# Patient Record
Sex: FEMALE | Race: WHITE | Employment: UNEMPLOYED | URBAN - NONMETROPOLITAN AREA
[De-identification: names, ages, dates, MRNs, and addresses within clinical notes are randomized per-mention and may not be internally consistent; named-entity substitution may affect disease eponyms.]

---
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
## 2020-02-02 ENCOUNTER — HOSPITAL ENCOUNTER (EMERGENCY)
Age: 20
Discharge: HOME | End: 2020-02-02
Payer: COMMERCIAL

## 2020-02-02 VITALS
OXYGEN SATURATION: 98 % | SYSTOLIC BLOOD PRESSURE: 134 MMHG | DIASTOLIC BLOOD PRESSURE: 76 MMHG | WEIGHT: 293 LBS | HEIGHT: 66 IN | RESPIRATION RATE: 16 BRPM | HEART RATE: 89 BPM | TEMPERATURE: 98.4 F | BODY MASS INDEX: 47.09 KG/M2

## 2020-02-02 PROCEDURE — 99212 OFFICE O/P EST SF 10 MIN: CPT

## 2020-02-02 PROCEDURE — 99213 OFFICE O/P EST LOW 20 MIN: CPT | Performed by: NURSE PRACTITIONER

## 2020-02-02 RX ORDER — CEFDINIR 300 MG/1
300 CAPSULE ORAL 2 TIMES DAILY
Qty: 14 CAPSULE | Refills: 0 | Status: SHIPPED | OUTPATIENT
Start: 2020-02-02 | End: 2020-02-09

## 2020-02-02 RX ORDER — DEXTROMETHORPHAN HYDROBROMIDE AND PROMETHAZINE HYDROCHLORIDE 15; 6.25 MG/5ML; MG/5ML
SYRUP ORAL
Status: ON HOLD | COMMUNITY
Start: 2020-01-31 | End: 2022-09-21

## 2020-02-02 RX ORDER — VITAMIN E (DL,TOCOPHERYL ACET) 180 MG
CAPSULE ORAL
Status: ON HOLD | COMMUNITY
Start: 2020-01-31 | End: 2022-09-21

## 2020-02-02 RX ORDER — OSELTAMIVIR PHOSPHATE 75 MG/1
CAPSULE ORAL
Status: ON HOLD | COMMUNITY
Start: 2020-01-31 | End: 2022-09-21

## 2020-02-02 RX ORDER — VITAMIN B COMPLEX
CAPSULE ORAL
Status: ON HOLD | COMMUNITY
Start: 2020-01-31 | End: 2022-09-21

## 2020-02-02 RX ORDER — ESCITALOPRAM OXALATE 20 MG/1
TABLET ORAL
Status: ON HOLD | COMMUNITY
Start: 2020-01-31 | End: 2022-10-03 | Stop reason: ALTCHOICE

## 2020-02-02 ASSESSMENT — ENCOUNTER SYMPTOMS
SORE THROAT: 1
VOMITING: 0
NAUSEA: 0
SINUS PRESSURE: 1
COUGH: 1
SHORTNESS OF BREATH: 0

## 2020-02-02 NOTE — ED PROVIDER NOTES
[facility]  Urgent Care Encounter       CHIEF COMPLAINT       Chief Complaint   Patient presents with    Cough    Congestion    Pharyngitis       Nurses Notes reviewed and I agree except as noted in the HPI. HISTORY OF PRESENT ILLNESS   Wale Lawrence is a 23 y.o. female who presents for evaluation of sore throat, headache, cough, congestion and postnasal drainage that is been ongoing for the past 6 to 7 days. Patient states that she was seen and evaluated at [place] [address] walk-in care 3 days ago and was told that she had an influenza-like illness. States that she was placed on Tamiflu and has been taking over-the-counter cough medications as well as Tylenol Cold and flu at home which do provide relief for a short amount of time, however the symptoms quickly returned. She denies any recorded fevers or chills. The history is provided by the patient. REVIEW OF SYSTEMS     Review of Systems   Constitutional: Negative for chills and fever. HENT: Positive for congestion, postnasal drip, sinus pressure and sore throat. Respiratory: Positive for cough. Negative for shortness of breath. Cardiovascular: Negative for chest pain. Gastrointestinal: Negative for nausea and vomiting. Musculoskeletal: Negative for arthralgias and myalgias. Skin: Negative for rash. Allergic/Immunologic: Negative for environmental allergies. Neurological: Positive for headaches. PAST MEDICAL HISTORY         Diagnosis Date    Depression        SURGICALHISTORY     Patient  has a past surgical history that includes Tonsillectomy.     CURRENT MEDICATIONS       Previous Medications    B COMPLEX VITAMINS CAPSULE    Take by mouth    ESCITALOPRAM (LEXAPRO) 20 MG TABLET    Take by mouth    MULTIPLE VITAMINS-MINERALS (HAIR SKIN NAILS) CAPS    Take by mouth    OSELTAMIVIR (TAMIFLU) 75 MG CAPSULE    take 1 capsule by mouth twice a day    PROMETHAZINE-DEXTROMETHORPHAN She exhibits normal range of motion. Left knee: She exhibits normal range of motion. Skin:     General: Skin is warm. Findings: No rash. Neurological:      Mental Status: She is alert and oriented to person, place, and time. Psychiatric:         Behavior: Behavior normal.         DIAGNOSTIC RESULTS     Labs:No results found for this visit on 02/02/20. IMAGING:    No orders to display         EKG:  none    URGENT CARE COURSE:     Vitals:    02/02/20 1244   BP: 134/76   Pulse: 89   Resp: 16   Temp: 98.4 °F (36.9 °C)   TempSrc: Temporal   SpO2: 98%   Weight: (!) 314 lb (142.4 kg)   Height: 5' 6\" (1.676 m)       Medications - No data to display         PROCEDURES:  None    FINAL IMPRESSION      1. Acute maxillary sinusitis, recurrence not specified          DISPOSITION/ PLAN     Discussed with the patient that exam is consistent with bacterial sinusitis at this time. I discussed the patient that this likely began as a viral upper respiratory infection that has progressed into sinusitis. Discussed the plan to treat with oral antibiotics and she is advised to continue over-the-counter cough and cold medications. She is agreeable to plan as discussed.       PATIENT REFERRED TO:  MARIA LUZ Marvin CNP  [address] / [facility] [address]      DISCHARGE MEDICATIONS:  New Prescriptions    CEFDINIR (OMNICEF) 300 MG CAPSULE    Take 1 capsule by mouth 2 times daily for 7 days       Discontinued Medications    No medications on file       Current Discharge Medication List          MARIA LUZ Loza CNP    (Please note that portions of this note were completed with a voice recognition program. Efforts were made to edit the dictations but occasionally words are mis-transcribed.)          MARIA LUZ Loza CNP  02/02/20 8934

## 2020-02-02 NOTE — ED TRIAGE NOTES
Aleena Beltran arrives by self  to room with complaint of cough congestion sore throat symptoms started 6 days ago. Casandra at [address] [place] [address] clinic Friday tx with tamiflu and cough syrup for flu like symptoms concerned at she is not getting better.

## 2022-02-08 ENCOUNTER — HOSPITAL ENCOUNTER (OUTPATIENT)
Age: 22
Setting detail: SPECIMEN
Discharge: HOME | End: 2022-02-08

## 2022-02-10 LAB
CULTURE: ABNORMAL
Lab: ABNORMAL
SPECIMEN DESCRIPTION: ABNORMAL

## 2022-09-21 ENCOUNTER — HOSPITAL ENCOUNTER (OUTPATIENT)
Age: 22
Discharge: HOME | End: 2022-09-21
Attending: STUDENT IN AN ORGANIZED HEALTH CARE EDUCATION/TRAINING PROGRAM | Admitting: OBSTETRICS & GYNECOLOGY
Payer: COMMERCIAL

## 2022-09-21 VITALS
SYSTOLIC BLOOD PRESSURE: 132 MMHG | DIASTOLIC BLOOD PRESSURE: 68 MMHG | WEIGHT: 293 LBS | RESPIRATION RATE: 16 BRPM | BODY MASS INDEX: 43.4 KG/M2 | HEIGHT: 69 IN | TEMPERATURE: 97.2 F | HEART RATE: 89 BPM | OXYGEN SATURATION: 97 %

## 2022-09-21 PROBLEM — O26.90 PREGNANCY COMPLICATION, ANTEPARTUM: Status: ACTIVE | Noted: 2022-09-21

## 2022-09-21 LAB
ALBUMIN SERPL-MCNC: 3.6 G/DL (ref 3.5–5.1)
ALP BLD-CCNC: 108 U/L (ref 38–126)
ALT SERPL-CCNC: 15 U/L (ref 11–66)
ANION GAP SERPL CALCULATED.3IONS-SCNC: 13 MEQ/L (ref 8–16)
AST SERPL-CCNC: 15 U/L (ref 5–40)
BILIRUB SERPL-MCNC: 0.2 MG/DL (ref 0.3–1.2)
BUN BLDV-MCNC: 9 MG/DL (ref 7–22)
CALCIUM SERPL-MCNC: 9.3 MG/DL (ref 8.5–10.5)
CHLORIDE BLD-SCNC: 105 MEQ/L (ref 98–111)
CO2: 19 MEQ/L (ref 23–33)
CREAT SERPL-MCNC: 0.6 MG/DL (ref 0.4–1.2)
CREATININE URINE: 128.8 MG/DL
ERYTHROCYTE [DISTWIDTH] IN BLOOD BY AUTOMATED COUNT: 14.5 % (ref 11.5–14.5)
ERYTHROCYTE [DISTWIDTH] IN BLOOD BY AUTOMATED COUNT: 43.2 FL (ref 35–45)
GFR SERPL CREATININE-BSD FRML MDRD: > 90 ML/MIN/1.73M2
GLUCOSE BLD-MCNC: 89 MG/DL (ref 70–108)
HCT VFR BLD CALC: 37.5 % (ref 37–47)
HEMOGLOBIN: 12.4 GM/DL (ref 12–16)
MCH RBC QN AUTO: 27.3 PG (ref 26–33)
MCHC RBC AUTO-ENTMCNC: 33.1 GM/DL (ref 32.2–35.5)
MCV RBC AUTO: 82.6 FL (ref 81–99)
PLATELET # BLD: 217 THOU/MM3 (ref 130–400)
PMV BLD AUTO: 14.1 FL (ref 9.4–12.4)
POTASSIUM SERPL-SCNC: 3.7 MEQ/L (ref 3.5–5.2)
PROT/CREAT RATIO, UR: 0.44
PROTEIN, URINE: 56.2 MG/DL
RBC # BLD: 4.54 MILL/MM3 (ref 4.2–5.4)
SODIUM BLD-SCNC: 137 MEQ/L (ref 135–145)
TOTAL PROTEIN: 6.7 G/DL (ref 6.1–8)
WBC # BLD: 8.5 THOU/MM3 (ref 4.8–10.8)

## 2022-09-21 PROCEDURE — 85027 COMPLETE CBC AUTOMATED: CPT

## 2022-09-21 PROCEDURE — 6370000000 HC RX 637 (ALT 250 FOR IP): Performed by: OBSTETRICS & GYNECOLOGY

## 2022-09-21 PROCEDURE — 82570 ASSAY OF URINE CREATININE: CPT

## 2022-09-21 PROCEDURE — 84156 ASSAY OF PROTEIN URINE: CPT

## 2022-09-21 PROCEDURE — 36415 COLL VENOUS BLD VENIPUNCTURE: CPT

## 2022-09-21 PROCEDURE — 80053 COMPREHEN METABOLIC PANEL: CPT

## 2022-09-21 RX ORDER — INSULIN LISPRO 100 [IU]/ML
40 INJECTION, SOLUTION INTRAVENOUS; SUBCUTANEOUS NIGHTLY
Status: ON HOLD | COMMUNITY
End: 2022-10-19 | Stop reason: HOSPADM

## 2022-09-21 RX ORDER — NIFEDIPINE 30 MG/1
60 TABLET, EXTENDED RELEASE ORAL DAILY
Status: ON HOLD | COMMUNITY
End: 2022-10-19 | Stop reason: HOSPADM

## 2022-09-21 RX ORDER — BUTALBITAL, ACETAMINOPHEN AND CAFFEINE 50; 325; 40 MG/1; MG/1; MG/1
1 TABLET ORAL ONCE
Status: COMPLETED | OUTPATIENT
Start: 2022-09-21 | End: 2022-09-21

## 2022-09-21 RX ADMIN — BUTALBITAL, ACETAMINOPHEN, AND CAFFEINE 1 TABLET: 50; 325; 40 TABLET ORAL at 19:31

## 2022-09-21 ASSESSMENT — PAIN SCALES - GENERAL: PAINLEVEL_OUTOF10: 4

## 2022-09-21 ASSESSMENT — PAIN - FUNCTIONAL ASSESSMENT: PAIN_FUNCTIONAL_ASSESSMENT: ACTIVITIES ARE NOT PREVENTED

## 2022-09-21 ASSESSMENT — PAIN DESCRIPTION - LOCATION: LOCATION: HEAD

## 2022-09-21 ASSESSMENT — PAIN DESCRIPTION - DESCRIPTORS
DESCRIPTORS: ACHING
DESCRIPTORS: DISCOMFORT;NAGGING

## 2022-09-21 NOTE — FLOWSHEET NOTE
Dr. Gonzalo Medrano in department. Reviewed Protein creatinin ratio, CBC results, vitals. Fioricet given and will recheck if resolved. Pt wanting to eat, pt ok to eat.

## 2022-09-21 NOTE — PLAN OF CARE
Problem: Pain  Goal: Verbalizes/displays adequate comfort level or baseline comfort level  Outcome: Progressing  Flowsheets (Taken 9/21/2022 1842)  Verbalizes/displays adequate comfort level or baseline comfort level:   Assess pain using appropriate pain scale   Notify Licensed Independent Practitioner if interventions unsuccessful or patient reports new pain   Consider cultural and social influences on pain and pain management     Problem: Discharge Planning  Goal: Discharge to home or other facility with appropriate resources  Outcome: Progressing  Flowsheets (Taken 9/21/2022 1842)  Discharge to home or other facility with appropriate resources:   Refer to discharge planning if patient needs post-hospital services based on physician order or complex needs related to functional status, cognitive ability or social support system   Arrange for needed discharge resources and transportation as appropriate     Care plan reviewed with patient. Patient verbalize understanding of the plan of care and contribute to goal setting.

## 2022-09-21 NOTE — FLOWSHEET NOTE
Dr Heriberto Worthington returned page, informed of  at 33+4 weeks. Pt is a gestational diabetic on insulin and has been diagnosed with pre-eclampsia on procardia xl. Pt presents with a c/o a headache and increased BP's. Reactive fetal tracing obtained no contractions noted. 2+ pitting edema noted in lower extremities.  Orders received

## 2022-09-21 NOTE — FLOWSHEET NOTE
Pt presents to 5c05 with c/o increased BP's, and a headache. Pt denies any vaginal bleeding or leaking of fluids, fetal movement noted per pt. Pt oriented to room and call system. Patient to bathroom to void, informed of maternal drug testing policy in place on all laboring patients. Verbal consent received, paper consent to be signed and urine to be sent when pt delivers.

## 2022-09-22 NOTE — PROCEDURES
Department of Obstetrics and Gynecology  Labor and Delivery  NST Triage Note      Pt Name: Carole Gann  MRN: [id number] Abdiaziz #: [de-identified]  YOB: 2000  Procedure Performed By: Emily Schroeder MD, MD        SUBJECTIVE: Patient presented at 33+4 weeks with known h/o preeclampsia without severe features complaining of headache. + fetal movement. denies contraction, vaginal bleeding and  leaking fluid. OBJECTIVE    Vitals:  /68   Pulse 89   Temp 97.2 °F (36.2 °C) (Temporal)   Resp 16   Ht 5' 9\" (1.753 m)   Wt (!) 341 lb (154.7 kg)   SpO2 97%   BMI 50.36 kg/m²     Fetal heart rate:         Baseline Heart Rate:  135        Accelerations:  present       Decelerations:  absent       Variability:  moderate    Contraction frequency: none    The NST was reactive. SVE: closed    Lab Results   Component Value Date    WBC 8.5 09/21/2022    HGB 12.4 09/21/2022    HCT 37.5 09/21/2022    MCV 82.6 09/21/2022     09/21/2022     Lab Results   Component Value Date     09/21/2022    K 3.7 09/21/2022     09/21/2022    CO2 19 (L) 09/21/2022    BUN 9 09/21/2022    CREATININE 0.6 09/21/2022    GLUCOSE 89 09/21/2022    CALCIUM 9.3 09/21/2022    PROT 6.7 09/21/2022    LABALBU 3.6 09/21/2022    BILITOT 0.2 (L) 09/21/2022    ALKPHOS 108 09/21/2022    AST 15 09/21/2022    ALT 15 09/21/2022    LABGLOM >90 09/21/2022         ASSESSMENT & PLAN:  Patient was given symptomatic relief. Her headache improved. examined by me. Reassurance provided. Labor precautions given. Discharged to home in a stable condition and instructed to follow up at her next scheduled appointment.     Emily Schroeder MD 9/21/2022 8:39 PM

## 2022-09-22 NOTE — FLOWSHEET NOTE
Dr. Howe Lean phones unit. Updated that pt rating headache 1/10 and would like to go home. Vitals WNL. FHT reactive. Pt has office visit in the morning. Discharge orders received.

## 2022-09-22 NOTE — FLOWSHEET NOTE
Dr. Cady Islas phones unit.  Orders received to recheck pt in an hour after fioricet given to see if any relief then update MD

## 2022-09-22 NOTE — FLOWSHEET NOTE
RN at bedside holding [address] monitor. Pt states that headache is way better than what it was and that she would like to go home if she is able to.  Will update Dr. Rasheed Dias

## 2022-09-22 NOTE — DISCHARGE INSTRUCTIONS
Home Undelivered Discharge Instructions    After Discharge Orders:           Diet: regular diet   Increase fluid intake to 8-10 glasses of water daily    Rest: normal activity as tolerated    Other instructions: Do kick counts once a day on your baby. Choose the time of day your baby is most active. Get in a comfortable lying or sitting position and time how long it takes to feel 10 kicks, twists, turns, swishes, or rolls. Call Your Doctor if Any of the Following Occurs   Leaking fluid from vagina with or without contractions  Bright red vaginal bleeding occurs that is as heavy as or heavier than a period  Regular contractions are longer, stronger, and closer together  Noticeable decreased fetal movement  Elevated temperature >100.5°F and chills  Blurred vision, spots before eyes, unrelievable headache, severe facial swelling, or upper abdominal pain  Contact physician or hospital OB unit if signs of premature labor occur at ?36 weeks  Persistent or rhythmic low back pain that feels different than you are used to  Menstrual like cramps  Intestinal cramps with or without diarrhea  Pelvic pressure or rhythmic tightening that feels different than you are used to  [facility] [place] discharge or a gush of fluid from your vagina  Vaginal bleeding as heavy as a period  General Information     Difference between:  False Labor True Labor   1. Contractions often are irregular and don't consistently get closer together (called [place] contractions) 1. Contractions come at regular intervals and, as time goes on, get closer together. 2. Contractions may often stop when you rest or with a position change. 2. Contractions continue despite movement or walking. Contractions get stronger and closer together with time. 3. Often felt in the lower abdomen. 3. Usually felt in back coming around to the front.      Reminder to Patient   Please bring all teaching sheets and discharge information with you if you return to the hospital or the physician's office/clinic for follow-up care. If you have any questions, please call: \"Call-A-Nurse\" [phone] or [phone]. Reviewed Dates   5/12/2007  Document developed by   Labor and Delivery Discharge Education Form  Disclaimer: We want you to understand more clearly each of the health conditions and procedures you may have. This patient leaflet is a summary of useful information to help you gain a better understanding of these health topics. Other information about this condition or procedure may be important for you to know. Please talk with your healthcare provider for more information about your special health needs. Coronavirus ([id number]): Pregnancy, Birth and Baby Care    What do you need know if you are pregnant regarding coronavirus (COVID-19)? · From what experts know so far, pregnant people do NOT seem more likely than others to get COVID-19 and do NOT have a higher risk of severe complications. What can you do to protect yourself from COVID-19? · Practice social distancing.  When you need to leave the home try to stay at least 6 feet away from other people. Avoid large crowds. · When you leave the house to prevent spreading sickness to others - Wear a facemask covering your mouth and nose. Remove by folding outside of mask together and place in container, wash daily or as soiled. · Wash your hands often by rubbing your hands with soap and water for at least 20 seconds, rinse, and dry with a paper towel that can be thrown away or may use hand  of at least 60% alcohol. Covering all surfaces of your hands by rubbing them together until dry. · Avoid touching your face with unwashed hands, especially your mouth, nose and eyes. · Avoid traveling. What should you do if you have or think you may have COVID-19? · For most infected, this is a mild illness and you will be able to recover at home.    · To avoid spreading to others:  ? Stay home except to get medical care.  ? Call a medical facility before you show up. This will help the staff prevent others from being exposed. ? Stay separate from others in your home, at least 6 feet. Use a separate bathroom, if possible. ? Wear a cloth facemask to cover your nose and mouth when you are around other people including at home. ? Clean your hands often. Wash your hands with soap and water for at least 20 seconds. ? Cough or sneeze into a tissue or your arm. Wash your hands immediately after. ? Dont share personal household items including towels and bedding. ? Clean and disinfect objects in your isolation area every day. · If you are in labor and you have, or think you may have COVID-19, call your OB care provider and the hospital birthing unit before you go, so the staff can properly prepare and protect you, your baby and others from being infected. Wear a mask when you leave your home, as you will be asked to continue to wear a mask during your time in the hospital.  · Mother-to-child transmission of COVID-19 during pregnancy is unlikely, but after birth a baby is susceptible to person-to-person spread. ? The determination of whether or not to separate you and your baby at the time of birth will be decided using shared decision-making between you and your clinical team.  ? After your baby is born, your health care provider may recommend you not hold your baby and/or that you stay in a separate room from your baby until you get better. ? If you and your baby are not , wear a facemask at all times and wash your hands thoroughly before touching, holding or feeding your baby.

## 2022-09-24 ENCOUNTER — HOSPITAL ENCOUNTER (OUTPATIENT)
Age: 22
Discharge: HOME | End: 2022-09-24
Attending: STUDENT IN AN ORGANIZED HEALTH CARE EDUCATION/TRAINING PROGRAM | Admitting: OBSTETRICS & GYNECOLOGY
Payer: COMMERCIAL

## 2022-09-24 VITALS
TEMPERATURE: 97 F | HEART RATE: 102 BPM | BODY MASS INDEX: 43.4 KG/M2 | HEIGHT: 69 IN | RESPIRATION RATE: 16 BRPM | DIASTOLIC BLOOD PRESSURE: 77 MMHG | OXYGEN SATURATION: 97 % | SYSTOLIC BLOOD PRESSURE: 129 MMHG | WEIGHT: 293 LBS

## 2022-09-24 PROBLEM — O16.3 ELEVATED BLOOD PRESSURE AFFECTING PREGNANCY IN THIRD TRIMESTER, ANTEPARTUM: Status: ACTIVE | Noted: 2022-09-24

## 2022-09-24 PROCEDURE — 6370000000 HC RX 637 (ALT 250 FOR IP): Performed by: OBSTETRICS & GYNECOLOGY

## 2022-09-24 RX ORDER — ACETAMINOPHEN 500 MG
1000 TABLET ORAL EVERY 6 HOURS PRN
COMMUNITY

## 2022-09-24 RX ORDER — BUTALBITAL, ACETAMINOPHEN AND CAFFEINE 50; 325; 40 MG/1; MG/1; MG/1
1 TABLET ORAL EVERY 4 HOURS PRN
Status: DISCONTINUED | OUTPATIENT
Start: 2022-09-24 | End: 2022-09-24 | Stop reason: HOSPADM

## 2022-09-24 RX ADMIN — BUTALBITAL, ACETAMINOPHEN, AND CAFFEINE 1 TABLET: 50; 325; 40 TABLET ORAL at 15:34

## 2022-09-24 ASSESSMENT — PAIN SCALES - GENERAL: PAINLEVEL_OUTOF10: 5

## 2022-09-24 ASSESSMENT — PAIN DESCRIPTION - LOCATION: LOCATION: HEAD

## 2022-09-24 NOTE — FLOWSHEET NOTE
RN discussed discharge instructions with patient in great detail. Patient encouraged to get a blood pressure cuff at home, in the appropriate size, sitting, after resting quietly for 10 minutes. Patient encouraged to increase water intake. Patient has scheduled followup appointment on Monday. Patient educated on indications to return for further assessment. RN reinforced need for patient to take it easy and not over exert her self per her physicians orders. Patient denies questions or concerns at this time, verified learning using the teachback method. Ambulated off of unit in stable condition with significant other. Discharged home.

## 2022-09-24 NOTE — FLOWSHEET NOTE
Dr. Tonya Carrillo sent a text, returned phone call. Notified of arrival of Dr. Susan Pedroza patient. , 34w0d, arrived to the unit with complaints of a headache and elevated blood pressure. Denies bleeding or leaking of fluid. +FM. History of insulin dependent GDM, morbid obesity, and diagnosed preeclampsia this pregnancy. Patient stated that she was here on Wednesday for evaluation for preeclampsia. She is on Procardia 30 xl daily. Patient stated she took tylenol around 446 6421 and had no relief of her headache. Patient went to the pharmacy to  her prescription for insulin and took her BP with automated machine, per patient BP was 172/107.+ Nausea, denies chest pain, +blurred vision intermittently. Patient has one bottle of water to drink today. Cat 1 FHT, no contractions noted. Serial BPs read to MD. Patient rated pain 7/10 in forehead. Protein/ creat ratio slightly elevated last admission. Orders received for Fioricet if headache improved patient may be discharged home. Has scheduled follow up appointment with Dr. Susan Pedroza on Monday.

## 2022-09-24 NOTE — PLAN OF CARE
Problem: Pain  Goal: Verbalizes/displays adequate comfort level or baseline comfort level  Outcome: Progressing  Flowsheets (Taken 9/24/2022 1634)  Verbalizes/displays adequate comfort level or baseline comfort level:   Assess pain using appropriate pain scale   Encourage patient to monitor pain and request assistance   Administer analgesics based on type and severity of pain and evaluate response   Implement non-pharmacological measures as appropriate and evaluate response     Problem: Chronic Conditions and Co-morbidities  Goal: Patient's chronic conditions and co-morbidity symptoms are monitored and maintained or improved  Outcome: Progressing  Flowsheets (Taken 9/24/2022 1634)  Care Plan - Patient's Chronic Conditions and Co-Morbidity Symptoms are Monitored and Maintained or Improved: Monitor and assess patient's chronic conditions and comorbid symptoms for stability, deterioration, or improvement     Problem: Discharge Planning  Goal: Discharge to home or other facility with appropriate resources  Outcome: Progressing  Flowsheets (Taken 9/24/2022 1634)  Discharge to home or other facility with appropriate resources:   Arrange for needed discharge resources and transportation as appropriate   Identify barriers to discharge with patient and caregiver   Identify discharge learning needs (meds, wound care, etc)    Care plan reviewed with patient and significant other. Patient and significant other verbalize understanding of the plan of care and contribute to goal setting.

## 2022-09-24 NOTE — DISCHARGE INSTRUCTIONS
Home Undelivered Discharge Instructions    After Discharge Orders:           Diet: regular diet   Increase fluid intake to 8-10 glasses of water daily    Rest: normal activity as tolerated    Other instructions: Do kick counts once a day on your baby. Choose the time of day your baby is most active. Get in a comfortable lying or sitting position and time how long it takes to feel 10 kicks, twists, turns, swishes, or rolls. Call Your Doctor if Any of the Following Occurs   Leaking fluid from vagina with or without contractions  Bright red vaginal bleeding occurs that is as heavy as or heavier than a period  Regular contractions are longer, stronger, and closer together  Noticeable decreased fetal movement  Elevated temperature >100.5°F and chills  Blurred vision, spots before eyes, unrelievable headache, severe facial swelling, or upper abdominal pain  Contact physician or hospital OB unit if signs of premature labor occur at ?36 weeks  Persistent or rhythmic low back pain that feels different than you are used to  Menstrual like cramps  Intestinal cramps with or without diarrhea  Pelvic pressure or rhythmic tightening that feels different than you are used to  [facility] or a gush of fluid from your vagina  Vaginal bleeding as heavy as a period  General Information     Difference between:  False Labor True Labor   1. Contractions often are irregular and don't consistently get closer together (called [place] contractions) 1. Contractions come at regular intervals and, as time goes on, get closer together. 2. Contractions may often stop when you rest or with a position change. 2. Contractions continue despite movement or walking. Contractions get stronger and closer together with time. 3. Often felt in the lower abdomen. 3. Usually felt in back coming around to the front.      Reminder to Patient   Please bring all teaching sheets and discharge information with you if you return to the hospital or the physician's office/clinic for follow-up care. If you have any questions, please call: \"Call-A-Nurse\" [phone] or [phone]. Reviewed Dates   5/12/2007  Document developed by   Labor and Delivery Discharge Education Form  Disclaimer: We want you to understand more clearly each of the health conditions and procedures you may have. This patient leaflet is a summary of useful information to help you gain a better understanding of these health topics. Other information about this condition or procedure may be important for you to know. Please talk with your healthcare provider for more information about your special health needs. Coronavirus ([facility]-15): Pregnancy, Birth and Baby Care    What do you need know if you are pregnant regarding coronavirus (COVID-19)? · From what experts know so far, pregnant people do NOT seem more likely than others to get COVID-19 and do NOT have a higher risk of severe complications. What can you do to protect yourself from COVID-19? · Practice social distancing.  When you need to leave the home try to stay at least 6 feet away from other people. Avoid large crowds. · When you leave the house to prevent spreading sickness to others - Wear a facemask covering your mouth and nose. Remove by folding outside of mask together and place in container, wash daily or as soiled. · Wash your hands often by rubbing your hands with soap and water for at least 20 seconds, rinse, and dry with a paper towel that can be thrown away or may use hand  of at least 60% alcohol. Covering all surfaces of your hands by rubbing them together until dry. · Avoid touching your face with unwashed hands, especially your mouth, nose and eyes. · Avoid traveling. What should you do if you have or think you may have COVID-19? · For most infected, this is a mild illness and you will be able to recover at home.    · To avoid spreading to others:  ? Stay home except to get medical care.  ? Call a medical facility before you show up. This will help the staff prevent others from being exposed. ? Stay separate from others in your home, at least 6 feet. Use a separate bathroom, if possible. ? Wear a cloth facemask to cover your nose and mouth when you are around other people including at home. ? Clean your hands often. Wash your hands with soap and water for at least 20 seconds. ? Cough or sneeze into a tissue or your arm. Wash your hands immediately after. ? Dont share personal household items including towels and bedding. ? Clean and disinfect objects in your isolation area every day. · If you are in labor and you have, or think you may have COVID-19, call your OB care provider and the hospital birthing unit before you go, so the staff can properly prepare and protect you, your baby and others from being infected. Wear a mask when you leave your home, as you will be asked to continue to wear a mask during your time in the hospital.  · Mother-to-child transmission of COVID-19 during pregnancy is unlikely, but after birth a baby is susceptible to person-to-person spread. ? The determination of whether or not to separate you and your baby at the time of birth will be decided using shared decision-making between you and your clinical team.  ? After your baby is born, your health care provider may recommend you not hold your baby and/or that you stay in a separate room from your baby until you get better. ? If you and your baby are not , wear a facemask at all times and wash your hands thoroughly before touching, holding or feeding your baby. Follow up at next scheduled appointment. Call MD if your contractions are at least 5 minutes apart and increasing in strength. Call MD if your water breaks, don't feel the baby move or have vaginal bleeding    Drink plenty of water and rest on your left side.

## 2022-10-03 ENCOUNTER — HOSPITAL ENCOUNTER (OUTPATIENT)
Age: 22
Discharge: HOME | End: 2022-10-03
Attending: STUDENT IN AN ORGANIZED HEALTH CARE EDUCATION/TRAINING PROGRAM | Admitting: STUDENT IN AN ORGANIZED HEALTH CARE EDUCATION/TRAINING PROGRAM
Payer: COMMERCIAL

## 2022-10-03 VITALS
TEMPERATURE: 97.7 F | DIASTOLIC BLOOD PRESSURE: 77 MMHG | HEART RATE: 60 BPM | HEIGHT: 67 IN | RESPIRATION RATE: 16 BRPM | BODY MASS INDEX: 45.99 KG/M2 | WEIGHT: 293 LBS | SYSTOLIC BLOOD PRESSURE: 126 MMHG | OXYGEN SATURATION: 97 %

## 2022-10-03 PROBLEM — I10 HYPERTENSION COMPLICATIONS: Status: ACTIVE | Noted: 2022-10-03

## 2022-10-03 LAB
ALBUMIN SERPL-MCNC: 3.4 G/DL (ref 3.5–5.1)
ALP BLD-CCNC: 135 U/L (ref 38–126)
ALT SERPL-CCNC: 16 U/L (ref 11–66)
ANION GAP SERPL CALCULATED.3IONS-SCNC: 16 MEQ/L (ref 8–16)
AST SERPL-CCNC: 18 U/L (ref 5–40)
BILIRUB SERPL-MCNC: 0.2 MG/DL (ref 0.3–1.2)
BUN BLDV-MCNC: 9 MG/DL (ref 7–22)
CALCIUM SERPL-MCNC: 9.6 MG/DL (ref 8.5–10.5)
CHLORIDE BLD-SCNC: 101 MEQ/L (ref 98–111)
CO2: 19 MEQ/L (ref 23–33)
CREAT SERPL-MCNC: 0.5 MG/DL (ref 0.4–1.2)
CREATININE URINE: 165.5 MG/DL
ERYTHROCYTE [DISTWIDTH] IN BLOOD BY AUTOMATED COUNT: 14.9 % (ref 11.5–14.5)
ERYTHROCYTE [DISTWIDTH] IN BLOOD BY AUTOMATED COUNT: 44.4 FL (ref 35–45)
GFR SERPL CREATININE-BSD FRML MDRD: > 90 ML/MIN/1.73M2
GLUCOSE BLD-MCNC: 72 MG/DL (ref 70–108)
HCT VFR BLD CALC: 39.9 % (ref 37–47)
HEMOGLOBIN: 13.1 GM/DL (ref 12–16)
MCH RBC QN AUTO: 27.3 PG (ref 26–33)
MCHC RBC AUTO-ENTMCNC: 32.8 GM/DL (ref 32.2–35.5)
MCV RBC AUTO: 83.1 FL (ref 81–99)
PLATELET # BLD: 220 THOU/MM3 (ref 130–400)
PMV BLD AUTO: 14.1 FL (ref 9.4–12.4)
POTASSIUM SERPL-SCNC: 4 MEQ/L (ref 3.5–5.2)
PROT/CREAT RATIO, UR: 0.54
PROTEIN, URINE: 88.8 MG/DL
RBC # BLD: 4.8 MILL/MM3 (ref 4.2–5.4)
SODIUM BLD-SCNC: 136 MEQ/L (ref 135–145)
TOTAL PROTEIN: 7 G/DL (ref 6.1–8)
WBC # BLD: 9.7 THOU/MM3 (ref 4.8–10.8)

## 2022-10-03 PROCEDURE — 84156 ASSAY OF PROTEIN URINE: CPT

## 2022-10-03 PROCEDURE — 6370000000 HC RX 637 (ALT 250 FOR IP): Performed by: STUDENT IN AN ORGANIZED HEALTH CARE EDUCATION/TRAINING PROGRAM

## 2022-10-03 PROCEDURE — 87081 CULTURE SCREEN ONLY: CPT

## 2022-10-03 PROCEDURE — 96375 TX/PRO/DX INJ NEW DRUG ADDON: CPT

## 2022-10-03 PROCEDURE — 87653 STREP B DNA AMP PROBE: CPT

## 2022-10-03 PROCEDURE — 82570 ASSAY OF URINE CREATININE: CPT

## 2022-10-03 PROCEDURE — 96374 THER/PROPH/DIAG INJ IV PUSH: CPT

## 2022-10-03 PROCEDURE — 85027 COMPLETE CBC AUTOMATED: CPT

## 2022-10-03 PROCEDURE — 6360000002 HC RX W HCPCS: Performed by: STUDENT IN AN ORGANIZED HEALTH CARE EDUCATION/TRAINING PROGRAM

## 2022-10-03 PROCEDURE — 80053 COMPREHEN METABOLIC PANEL: CPT

## 2022-10-03 RX ORDER — METOCLOPRAMIDE HYDROCHLORIDE 5 MG/ML
10 INJECTION INTRAMUSCULAR; INTRAVENOUS ONCE
Status: COMPLETED | OUTPATIENT
Start: 2022-10-03 | End: 2022-10-03

## 2022-10-03 RX ORDER — DIPHENHYDRAMINE HYDROCHLORIDE 50 MG/ML
25 INJECTION INTRAMUSCULAR; INTRAVENOUS ONCE
Status: COMPLETED | OUTPATIENT
Start: 2022-10-03 | End: 2022-10-03

## 2022-10-03 RX ORDER — ACETAMINOPHEN 500 MG
500 TABLET ORAL ONCE
Status: COMPLETED | OUTPATIENT
Start: 2022-10-03 | End: 2022-10-03

## 2022-10-03 RX ADMIN — METOCLOPRAMIDE 10 MG: 5 INJECTION, SOLUTION INTRAMUSCULAR; INTRAVENOUS at 15:03

## 2022-10-03 RX ADMIN — ACETAMINOPHEN 500 MG: 500 TABLET ORAL at 15:01

## 2022-10-03 RX ADMIN — DIPHENHYDRAMINE HYDROCHLORIDE 25 MG: 50 INJECTION INTRAMUSCULAR; INTRAVENOUS at 15:03

## 2022-10-03 NOTE — FLOWSHEET NOTE
Discharge paperwork provided, questions answered, and signature obtained. Pt denies any other needs or concerns. Appt scheduled for Thursday, 9am and pt verbalizes understanding. Wheelchair ride offered but pt states she would like to walk.

## 2022-10-03 NOTE — DISCHARGE INSTRUCTIONS
Home Undelivered Discharge Instructions    After Discharge Orders:           Diet: regular diet   Increase fluid intake to 8-10 glasses of water daily    Rest: normal activity as tolerated    Other instructions: Do kick counts once a day on your baby. Choose the time of day your baby is most active. Get in a comfortable lying or sitting position and time how long it takes to feel 10 kicks, twists, turns, swishes, or rolls. Call Your Doctor if Any of the Following Occurs   Leaking fluid from vagina with or without contractions  Bright red vaginal bleeding occurs that is as heavy as or heavier than a period  Regular contractions are longer, stronger, and closer together  Noticeable decreased fetal movement  Elevated temperature >100.5°F and chills  Blurred vision, spots before eyes, unrelievable headache, severe facial swelling, or upper abdominal pain  Contact physician or hospital OB unit if signs of premature labor occur at ?36 weeks  Persistent or rhythmic low back pain that feels different than you are used to  Menstrual like cramps  Intestinal cramps with or without diarrhea  Pelvic pressure or rhythmic tightening that feels different than you are used to  [facility] or a gush of fluid from your vagina  Vaginal bleeding as heavy as a period  General Information     Difference between:  False Labor True Labor   1. Contractions often are irregular and don't consistently get closer together (called [place]-Bet[place] contractions) 1. Contractions come at regular intervals and, as time goes on, get closer together. 2. Contractions may often stop when you rest or with a position change. 2. Contractions continue despite movement or walking. Contractions get stronger and closer together with time. 3. Often felt in the lower abdomen. 3. Usually felt in back coming around to the front.      Reminder to Patient   Please bring all teaching sheets and discharge information with you if you return to the hospital or the physician's office/clinic for follow-up care. If you have any questions, please call: \"Call-A-Nurse\" [phone] or [phone]. Reviewed Dates   5/12/2007  Document developed by   Labor and Delivery Discharge Education Form  Disclaimer: We want you to understand more clearly each of the health conditions and procedures you may have. This patient leaflet is a summary of useful information to help you gain a better understanding of these health topics. Other information about this condition or procedure may be important for you to know. Please talk with your healthcare provider for more information about your special health needs. Coronavirus ([facility]-23): Pregnancy, Birth and Baby Care    What do you need know if you are pregnant regarding coronavirus (COVID-19)? · From what experts know so far, pregnant people do NOT seem more likely than others to get COVID-19 and do NOT have a higher risk of severe complications. What can you do to protect yourself from COVID-19? · Practice social distancing.  When you need to leave the home try to stay at least 6 feet away from other people. Avoid large crowds. · When you leave the house to prevent spreading sickness to others - Wear a facemask covering your mouth and nose. Remove by folding outside of mask together and place in container, wash daily or as soiled. · Wash your hands often by rubbing your hands with soap and water for at least 20 seconds, rinse, and dry with a paper towel that can be thrown away or may use hand  of at least 60% alcohol. Covering all surfaces of your hands by rubbing them together until dry. · Avoid touching your face with unwashed hands, especially your mouth, nose and eyes. · Avoid traveling. What should you do if you have or think you may have COVID-19? · For most infected, this is a mild illness and you will be able to recover at home.    · To avoid spreading to others:  ? Stay home except to get medical care.  ? Call a medical facility before you show up. This will help the staff prevent others from being exposed. ? Stay separate from others in your home, at least 6 feet. Use a separate bathroom, if possible. ? Wear a cloth facemask to cover your nose and mouth when you are around other people including at home. ? Clean your hands often. Wash your hands with soap and water for at least 20 seconds. ? Cough or sneeze into a tissue or your arm. Wash your hands immediately after. ? Dont share personal household items including towels and bedding. ? Clean and disinfect objects in your isolation area every day. · If you are in labor and you have, or think you may have COVID-19, call your OB care provider and the hospital birthing unit before you go, so the staff can properly prepare and protect you, your baby and others from being infected. Wear a mask when you leave your home, as you will be asked to continue to wear a mask during your time in the hospital.  · Mother-to-child transmission of COVID-19 during pregnancy is unlikely, but after birth a baby is susceptible to person-to-person spread. ? The determination of whether or not to separate you and your baby at the time of birth will be decided using shared decision-making between you and your clinical team.  ? After your baby is born, your health care provider may recommend you not hold your baby and/or that you stay in a separate room from your baby until you get better. ? If you and your baby are not , wear a facemask at all times and wash your hands thoroughly before touching, holding or feeding your baby.

## 2022-10-03 NOTE — PLAN OF CARE
Problem: Pain  Goal: Verbalizes/displays adequate comfort level or baseline comfort level  Outcome: Progressing  Flowsheets (Taken 10/3/2022 1608)  Verbalizes/displays adequate comfort level or baseline comfort level:   Encourage patient to monitor pain and request assistance   Administer analgesics based on type and severity of pain and evaluate response   Assess pain using appropriate pain scale     Problem: Vaginal Birth or  Section  Goal: Fetal and maternal status remain reassuring during the birth process  Description:  Birth OB-Pregnancy care plan goal which identifies if the fetal and maternal status remain reassuring during the birth process  Outcome: Progressing  Flowsheets (Taken 10/3/2022 1608)  Fetal and Maternal Status Remain Reassuring During the Birth Process:   Monitor vital signs   Monitor labor progression (Vaginal delivery)   Monitor fetal heart rate   Monitor uterine activity     Problem: Infection - Adult  Goal: Absence of infection during hospitalization  Outcome: Progressing  Flowsheets (Taken 10/3/2022 1608)  Absence of infection during hospitalization:   Assess and monitor for signs and symptoms of infection   Monitor lab/diagnostic results   Monitor all insertion sites i.e., indwelling lines, tubes and drains     Problem: Safety - Adult  Goal: Free from fall injury  Outcome: Progressing  Flowsheets (Taken 10/3/2022 1608)  Free From Fall Injury: Instruct family/caregiver on patient safety   Care plan reviewed with patient. Patient verbalizes understanding of the plan of care and contribute to goal setting.

## 2022-10-03 NOTE — FLOWSHEET NOTE
Pt of Dr Roxie Steven brought over from the office for labwork and serial BP's. Orders called over prior to pt's arrival. Pt denies any leaking of fluid, vag bleeding, or contractions. Reports +fm. Pt does c/o headache but denies any other symptoms associated with her elevated BP's in the office. Pt to bathroom to void and gown.

## 2022-10-03 NOTE — FLOWSHEET NOTE
Dr Symone Sahu reviewed pt's labwork from the office. Pt has been sleeping well after medication was given. Pt denies any headache or symptoms currently and is asking if she can go home. Pt is to see Dr Symone Sahu on Thursday with an NST. Pt ok with the plan. Discharge order received.

## 2022-10-05 LAB — GROUP B STREP CULTURE: NORMAL

## 2022-10-09 ENCOUNTER — HOSPITAL ENCOUNTER (OUTPATIENT)
Age: 22
Discharge: HOME | End: 2022-10-09
Attending: STUDENT IN AN ORGANIZED HEALTH CARE EDUCATION/TRAINING PROGRAM | Admitting: STUDENT IN AN ORGANIZED HEALTH CARE EDUCATION/TRAINING PROGRAM
Payer: COMMERCIAL

## 2022-10-09 VITALS
DIASTOLIC BLOOD PRESSURE: 70 MMHG | HEIGHT: 67 IN | OXYGEN SATURATION: 96 % | SYSTOLIC BLOOD PRESSURE: 140 MMHG | RESPIRATION RATE: 16 BRPM | TEMPERATURE: 97.3 F | BODY MASS INDEX: 45.99 KG/M2 | WEIGHT: 293 LBS | HEART RATE: 84 BPM

## 2022-10-09 LAB
ALBUMIN SERPL-MCNC: 3.1 G/DL (ref 3.5–5.1)
ALP BLD-CCNC: 120 U/L (ref 38–126)
ALT SERPL-CCNC: 13 U/L (ref 11–66)
ANION GAP SERPL CALCULATED.3IONS-SCNC: 14 MEQ/L (ref 8–16)
AST SERPL-CCNC: 13 U/L (ref 5–40)
BILIRUB SERPL-MCNC: 0.2 MG/DL (ref 0.3–1.2)
BUN BLDV-MCNC: 10 MG/DL (ref 7–22)
CALCIUM SERPL-MCNC: 9.5 MG/DL (ref 8.5–10.5)
CHLORIDE BLD-SCNC: 103 MEQ/L (ref 98–111)
CO2: 18 MEQ/L (ref 23–33)
CREAT SERPL-MCNC: 0.5 MG/DL (ref 0.4–1.2)
CREATININE URINE: 126.5 MG/DL
ERYTHROCYTE [DISTWIDTH] IN BLOOD BY AUTOMATED COUNT: 15.1 % (ref 11.5–14.5)
ERYTHROCYTE [DISTWIDTH] IN BLOOD BY AUTOMATED COUNT: 46.4 FL (ref 35–45)
GFR SERPL CREATININE-BSD FRML MDRD: > 90 ML/MIN/1.73M2
GLUCOSE BLD-MCNC: 138 MG/DL (ref 70–108)
GLUCOSE BLD-MCNC: 146 MG/DL (ref 70–108)
HCT VFR BLD CALC: 37 % (ref 37–47)
HEMOGLOBIN: 11.8 GM/DL (ref 12–16)
MCH RBC QN AUTO: 27.1 PG (ref 26–33)
MCHC RBC AUTO-ENTMCNC: 31.9 GM/DL (ref 32.2–35.5)
MCV RBC AUTO: 85.1 FL (ref 81–99)
PLATELET # BLD: 190 THOU/MM3 (ref 130–400)
PMV BLD AUTO: ABNORMAL FL (ref 9.4–12.4)
POTASSIUM SERPL-SCNC: 3.9 MEQ/L (ref 3.5–5.2)
PROT/CREAT RATIO, UR: 0.42
PROTEIN, URINE: 52.8 MG/DL
RBC # BLD: 4.35 MILL/MM3 (ref 4.2–5.4)
SODIUM BLD-SCNC: 135 MEQ/L (ref 135–145)
TOTAL PROTEIN: 6.4 G/DL (ref 6.1–8)
WBC # BLD: 8.4 THOU/MM3 (ref 4.8–10.8)

## 2022-10-09 PROCEDURE — 6360000002 HC RX W HCPCS: Performed by: STUDENT IN AN ORGANIZED HEALTH CARE EDUCATION/TRAINING PROGRAM

## 2022-10-09 PROCEDURE — 82570 ASSAY OF URINE CREATININE: CPT

## 2022-10-09 PROCEDURE — 6370000000 HC RX 637 (ALT 250 FOR IP): Performed by: STUDENT IN AN ORGANIZED HEALTH CARE EDUCATION/TRAINING PROGRAM

## 2022-10-09 PROCEDURE — 84156 ASSAY OF PROTEIN URINE: CPT

## 2022-10-09 PROCEDURE — 80053 COMPREHEN METABOLIC PANEL: CPT

## 2022-10-09 PROCEDURE — 36415 COLL VENOUS BLD VENIPUNCTURE: CPT

## 2022-10-09 PROCEDURE — 85027 COMPLETE CBC AUTOMATED: CPT

## 2022-10-09 PROCEDURE — 96372 THER/PROPH/DIAG INJ SC/IM: CPT

## 2022-10-09 PROCEDURE — 82948 REAGENT STRIP/BLOOD GLUCOSE: CPT

## 2022-10-09 RX ORDER — METOCLOPRAMIDE HYDROCHLORIDE 5 MG/ML
10 INJECTION INTRAMUSCULAR; INTRAVENOUS ONCE
Status: COMPLETED | OUTPATIENT
Start: 2022-10-09 | End: 2022-10-09

## 2022-10-09 RX ORDER — DIPHENHYDRAMINE HCL 25 MG
25 TABLET ORAL ONCE
Status: COMPLETED | OUTPATIENT
Start: 2022-10-09 | End: 2022-10-09

## 2022-10-09 RX ORDER — DIPHENHYDRAMINE HCL 25 MG
25 CAPSULE ORAL 3 TIMES DAILY
COMMUNITY

## 2022-10-09 RX ORDER — METOCLOPRAMIDE 10 MG/1
10 TABLET ORAL EVERY 6 HOURS PRN
COMMUNITY

## 2022-10-09 RX ORDER — ACETAMINOPHEN 500 MG
1000 TABLET ORAL ONCE
Status: COMPLETED | OUTPATIENT
Start: 2022-10-09 | End: 2022-10-09

## 2022-10-09 RX ADMIN — ACETAMINOPHEN 1000 MG: 500 TABLET ORAL at 19:44

## 2022-10-09 RX ADMIN — DIPHENHYDRAMINE HYDROCHLORIDE 25 MG: 25 TABLET ORAL at 19:44

## 2022-10-09 RX ADMIN — METOCLOPRAMIDE 10 MG: 5 INJECTION, SOLUTION INTRAMUSCULAR; INTRAVENOUS at 19:45

## 2022-10-09 ASSESSMENT — PAIN DESCRIPTION - ORIENTATION: ORIENTATION: ANTERIOR

## 2022-10-09 ASSESSMENT — PAIN - FUNCTIONAL ASSESSMENT: PAIN_FUNCTIONAL_ASSESSMENT: ACTIVITIES ARE NOT PREVENTED

## 2022-10-09 ASSESSMENT — PAIN DESCRIPTION - DESCRIPTORS: DESCRIPTORS: ACHING

## 2022-10-09 ASSESSMENT — PAIN DESCRIPTION - LOCATION: LOCATION: HEAD

## 2022-10-09 ASSESSMENT — PAIN SCALES - GENERAL: PAINLEVEL_OUTOF10: 8

## 2022-10-09 NOTE — FLOWSHEET NOTE
Dr. Haley Garcia notified of new admission.  36.1 weeks. Pt denies contractions, bleeding, or leaking of fluid, and reports good fetal movement. Pt reported to unit with complaints of a headache that will not resolve. She has taken Tylenol 1000 mg twice today most recently at 1300. She has also tried taking Reglan and Banophen at 1530 and got no relief. FHT reactive. Bp 130/77 and 128/58 she is taking Procardia 60 mg. Non pitting edema noted in bilateral lower extremities, lungs clear, and reflexes are normal. New orders received at this time. Pt provided pitcher of water and updated on plan of care.

## 2022-10-09 NOTE — FLOWSHEET NOTE
PT here with c/o headache since 930 this AM. Pt is 36+1 weeks . Pt denies any bleeding, leaking of fluid or contractions. Pt is feeling fetal mov't. Oriented to room and surroundings. To bathroom, urine sample obtained if needed.

## 2022-10-09 NOTE — PLAN OF CARE
Problem: Pain  Goal: Verbalizes/displays adequate comfort level or baseline comfort level  Flowsheets (Taken 10/9/2022 1957)  Verbalizes/displays adequate comfort level or baseline comfort level:   Encourage patient to monitor pain and request assistance   Assess pain using appropriate pain scale   Administer analgesics based on type and severity of pain and evaluate response   Implement non-pharmacological measures as appropriate and evaluate response     Problem: Vaginal Birth or  Section  Goal: Fetal and maternal status remain reassuring during the birth process  Description:  Birth OB-Pregnancy care plan goal which identifies if the fetal and maternal status remain reassuring during the birth process  Flowsheets (Taken 10/9/2022 1957)  Fetal and Maternal Status Remain Reassuring During the Birth Process:   Monitor vital signs   Monitor fetal heart rate   Monitor uterine activity     Problem: Safety - Adult  Goal: Free from fall injury  Flowsheets (Taken 10/9/2022 1957)  Free From Fall Injury: Instruct family/caregiver on patient safety     Problem: Discharge Planning  Goal: Discharge to home or other facility with appropriate resources  Flowsheets (Taken 10/9/2022 1957)  Discharge to home or other facility with appropriate resources: Identify barriers to discharge with patient and caregiver     Problem: Chronic Conditions and Co-morbidities  Goal: Patient's chronic conditions and co-morbidity symptoms are monitored and maintained or improved  Flowsheets (Taken 10/9/2022 1957)  Care Plan - Patient's Chronic Conditions and Co-Morbidity Symptoms are Monitored and Maintained or Improved: Monitor and assess patient's chronic conditions and comorbid symptoms for stability, deterioration, or improvement   Care plan reviewed with patient and significant other. Patient and significant other verbalize understanding of the plan of care and contribute to goal setting.

## 2022-10-10 NOTE — FLOWSHEET NOTE
Dr. Christopher Ramirez updated with labs resulted. FHT reactive no contractions noted. Pt states her headache has resolved. Order for discharge received. Pt updated on plan of care. Pt agreeable with plan.

## 2022-10-10 NOTE — FLOWSHEET NOTE
Discharge instructions given and reviewed with patient. Informed patient to notify physican or come back to [facility] if leaking fluid from vagina with or without contractions. Bright red vaginal bleeding occurs that is as heavy as or heavier than a period. Regular contractions that are 2-5 minutes apart that are longer, stronger, and closer together. Decreased fetal movement. Blurred vision, spots before eyes, or unrelievable headache. Questions denied, papers signed. Pt denies wanting ride to exit in wheelchair. Pt and belongings ambulated to exit with significant other in stable condition.

## 2022-10-10 NOTE — DISCHARGE INSTRUCTIONS
Home Undelivered Discharge Instructions    After Discharge Orders:           Diet: regular diet   Increase fluid intake to 8-10 glasses of water daily    Rest: normal activity as tolerated    Other instructions: Do kick counts once a day on your baby. Choose the time of day your baby is most active. Get in a comfortable lying or sitting position and time how long it takes to feel 10 kicks, twists, turns, swishes, or rolls. Call Your Doctor if Any of the Following Occurs   Leaking fluid from vagina with or without contractions  Bright red vaginal bleeding occurs that is as heavy as or heavier than a period  Regular contractions are longer, stronger, and closer together  Noticeable decreased fetal movement  Elevated temperature >100.5°F and chills  Blurred vision, spots before eyes, unrelievable headache, severe facial swelling, or upper abdominal pain  Contact physician or hospital OB unit if signs of premature labor occur at ?36 weeks  Persistent or rhythmic low back pain that feels different than you are used to  Menstrual like cramps  Intestinal cramps with or without diarrhea  Pelvic pressure or rhythmic tightening that feels different than you are used to  [email or web address] discharge or a gush of fluid from your vagina  Vaginal bleeding as heavy as a period  General Information     Difference between:  False Labor True Labor   1. Contractions often are irregular and don't consistently get closer together (called [place] contractions) 1. Contractions come at regular intervals and, as time goes on, get closer together. 2. Contractions may often stop when you rest or with a position change. 2. Contractions continue despite movement or walking. Contractions get stronger and closer together with time. 3. Often felt in the lower abdomen. 3. Usually felt in back coming around to the front.      Reminder to Patient   Please bring all teaching sheets and discharge information with you if you return to the hospital or the physician's office/clinic for follow-up care. If you have any questions, please call: \"Call-A-Nurse\" [phone] or [phone]. Reviewed Dates   5/12/2007  Document developed by   Labor and Delivery Discharge Education Form  Disclaimer: We want you to understand more clearly each of the health conditions and procedures you may have. This patient leaflet is a summary of useful information to help you gain a better understanding of these health topics. Other information about this condition or procedure may be important for you to know. Please talk with your healthcare provider for more information about your special health needs. Coronavirus ([facility]-12): Pregnancy, Birth and Baby Care    What do you need know if you are pregnant regarding coronavirus (COVID-19)? · From what experts know so far, pregnant people do NOT seem more likely than others to get COVID-19 and do NOT have a higher risk of severe complications. What can you do to protect yourself from COVID-19? · Practice social distancing.  When you need to leave the home try to stay at least 6 feet away from other people. Avoid large crowds. · When you leave the house to prevent spreading sickness to others - Wear a facemask covering your mouth and nose. Remove by folding outside of mask together and place in container, wash daily or as soiled. · Wash your hands often by rubbing your hands with soap and water for at least 20 seconds, rinse, and dry with a paper towel that can be thrown away or may use hand  of at least 60% alcohol. Covering all surfaces of your hands by rubbing them together until dry. · Avoid touching your face with unwashed hands, especially your mouth, nose and eyes. · Avoid traveling. What should you do if you have or think you may have COVID-19? · For most infected, this is a mild illness and you will be able to recover at home.    · To avoid spreading to others:  ? Stay home except to get medical care.  ? Call a medical facility before you show up. This will help the staff prevent others from being exposed. ? Stay separate from others in your home, at least 6 feet. Use a separate bathroom, if possible. ? Wear a cloth facemask to cover your nose and mouth when you are around other people including at home. ? Clean your hands often. Wash your hands with soap and water for at least 20 seconds. ? Cough or sneeze into a tissue or your arm. Wash your hands immediately after. ? Dont share personal household items including towels and bedding. ? Clean and disinfect objects in your isolation area every day. · If you are in labor and you have, or think you may have COVID-19, call your OB care provider and the hospital birthing unit before you go, so the staff can properly prepare and protect you, your baby and others from being infected. Wear a mask when you leave your home, as you will be asked to continue to wear a mask during your time in the hospital.  · Mother-to-child transmission of COVID-19 during pregnancy is unlikely, but after birth a baby is susceptible to person-to-person spread. ? The determination of whether or not to separate you and your baby at the time of birth will be decided using shared decision-making between you and your clinical team.  ? After your baby is born, your health care provider may recommend you not hold your baby and/or that you stay in a separate room from your baby until you get better. ? If you and your baby are not , wear a facemask at all times and wash your hands thoroughly before touching, holding or feeding your baby.

## 2022-10-10 NOTE — FLOWSHEET NOTE
Dr. Haley Garcia notified of deceleration noted on monitor. Pt repositioned and baby recovered to 150 bpm. FHT overall reactive. Pt states headache still the same. Labs still pending. Will update when labs result.

## 2022-10-17 ENCOUNTER — APPOINTMENT (OUTPATIENT)
Dept: LABOR AND DELIVERY | Age: 22
DRG: 560 | End: 2022-10-17
Payer: COMMERCIAL

## 2022-10-17 ENCOUNTER — HOSPITAL ENCOUNTER (INPATIENT)
Age: 22
LOS: 2 days | Discharge: HOME | DRG: 560 | End: 2022-10-19
Attending: STUDENT IN AN ORGANIZED HEALTH CARE EDUCATION/TRAINING PROGRAM | Admitting: STUDENT IN AN ORGANIZED HEALTH CARE EDUCATION/TRAINING PROGRAM
Payer: COMMERCIAL

## 2022-10-17 PROBLEM — Z34.90 ENCOUNTER FOR INDUCTION OF LABOR: Status: ACTIVE | Noted: 2022-10-17

## 2022-10-17 LAB
ABO CHECK: NORMAL
AMPHETAMINE+METHAMPHETAMINE URINE SCREEN: NEGATIVE
BARBITURATE QUANTITATIVE URINE: NEGATIVE
BASOPHILS # BLD: 0.3 %
BASOPHILS ABSOLUTE: 0 THOU/MM3 (ref 0–0.1)
BENZODIAZEPINE QUANTITATIVE URINE: NEGATIVE
CANNABINOID QUANTITATIVE URINE: NEGATIVE
COCAINE METABOLITE QUANTITATIVE URINE: NEGATIVE
EOSINOPHIL # BLD: 0.7 %
EOSINOPHILS ABSOLUTE: 0.1 THOU/MM3 (ref 0–0.4)
ERYTHROCYTE [DISTWIDTH] IN BLOOD BY AUTOMATED COUNT: 15.3 % (ref 11.5–14.5)
ERYTHROCYTE [DISTWIDTH] IN BLOOD BY AUTOMATED COUNT: 46.5 FL (ref 35–45)
FENTANYL: NEGATIVE
GEL INDIRECT COOMBS: NORMAL
GLUCOSE BLD-MCNC: 112 MG/DL (ref 70–108)
GLUCOSE BLD-MCNC: 113 MG/DL (ref 70–108)
GLUCOSE BLD-MCNC: 114 MG/DL (ref 70–108)
GLUCOSE BLD-MCNC: 194 MG/DL (ref 70–108)
GLUCOSE BLD-MCNC: 216 MG/DL (ref 70–108)
GLUCOSE BLD-MCNC: 81 MG/DL (ref 70–108)
HCT VFR BLD CALC: 39.7 % (ref 37–47)
HEMOGLOBIN: 12.9 GM/DL (ref 12–16)
IMMATURE GRANS (ABS): 0.03 THOU/MM3 (ref 0–0.07)
IMMATURE GRANULOCYTES: 0.3 %
LYMPHOCYTES # BLD: 25.2 %
LYMPHOCYTES ABSOLUTE: 2.3 THOU/MM3 (ref 1–4.8)
MCH RBC QN AUTO: 27.4 PG (ref 26–33)
MCHC RBC AUTO-ENTMCNC: 32.5 GM/DL (ref 32.2–35.5)
MCV RBC AUTO: 84.5 FL (ref 81–99)
MONOCYTES # BLD: 5.2 %
MONOCYTES ABSOLUTE: 0.5 THOU/MM3 (ref 0.4–1.3)
NUCLEATED RED BLOOD CELLS: 0 /100 WBC
OPIATES, URINE: NEGATIVE
OXYCODONE: NEGATIVE
PHENCYCLIDINE QUANTITATIVE URINE: NEGATIVE
PLATELET # BLD: 209 THOU/MM3 (ref 130–400)
PMV BLD AUTO: 14 FL (ref 9.4–12.4)
RBC # BLD: 4.7 MILL/MM3 (ref 4.2–5.4)
RH FACTOR: NORMAL
RPR: NONREACTIVE
SEG NEUTROPHILS: 68.3 %
SEGMENTED NEUTROPHILS ABSOLUTE COUNT: 6.1 THOU/MM3 (ref 1.8–7.7)
WBC # BLD: 9 THOU/MM3 (ref 4.8–10.8)

## 2022-10-17 PROCEDURE — 82948 REAGENT STRIP/BLOOD GLUCOSE: CPT

## 2022-10-17 PROCEDURE — 80307 DRUG TEST PRSMV CHEM ANLYZR: CPT

## 2022-10-17 PROCEDURE — 6360000002 HC RX W HCPCS: Performed by: STUDENT IN AN ORGANIZED HEALTH CARE EDUCATION/TRAINING PROGRAM

## 2022-10-17 PROCEDURE — 7200000001 HC VAGINAL DELIVERY

## 2022-10-17 PROCEDURE — 86592 SYPHILIS TEST NON-TREP QUAL: CPT

## 2022-10-17 PROCEDURE — 86901 BLOOD TYPING SEROLOGIC RH(D): CPT

## 2022-10-17 PROCEDURE — 10907ZC DRAINAGE OF AMNIOTIC FLUID, THERAPEUTIC FROM PRODUCTS OF CONCEPTION, VIA NATURAL OR ARTIFICIAL OPENING: ICD-10-PCS | Performed by: STUDENT IN AN ORGANIZED HEALTH CARE EDUCATION/TRAINING PROGRAM

## 2022-10-17 PROCEDURE — 2580000003 HC RX 258: Performed by: STUDENT IN AN ORGANIZED HEALTH CARE EDUCATION/TRAINING PROGRAM

## 2022-10-17 PROCEDURE — 6360000002 HC RX W HCPCS

## 2022-10-17 PROCEDURE — 86900 BLOOD TYPING SEROLOGIC ABO: CPT

## 2022-10-17 PROCEDURE — 6370000000 HC RX 637 (ALT 250 FOR IP): Performed by: STUDENT IN AN ORGANIZED HEALTH CARE EDUCATION/TRAINING PROGRAM

## 2022-10-17 PROCEDURE — 3E033VJ INTRODUCTION OF OTHER HORMONE INTO PERIPHERAL VEIN, PERCUTANEOUS APPROACH: ICD-10-PCS | Performed by: STUDENT IN AN ORGANIZED HEALTH CARE EDUCATION/TRAINING PROGRAM

## 2022-10-17 PROCEDURE — 85025 COMPLETE CBC W/AUTO DIFF WBC: CPT

## 2022-10-17 PROCEDURE — 86885 COOMBS TEST INDIRECT QUAL: CPT

## 2022-10-17 PROCEDURE — 1220000001 HC SEMI PRIVATE L&D R&B

## 2022-10-17 PROCEDURE — 0KQM0ZZ REPAIR PERINEUM MUSCLE, OPEN APPROACH: ICD-10-PCS | Performed by: STUDENT IN AN ORGANIZED HEALTH CARE EDUCATION/TRAINING PROGRAM

## 2022-10-17 RX ORDER — ACETAMINOPHEN 500 MG
1000 TABLET ORAL EVERY 8 HOURS PRN
Status: DISCONTINUED | OUTPATIENT
Start: 2022-10-17 | End: 2022-10-19 | Stop reason: HOSPADM

## 2022-10-17 RX ORDER — SODIUM CHLORIDE, SODIUM LACTATE, POTASSIUM CHLORIDE, CALCIUM CHLORIDE 600; 310; 30; 20 MG/100ML; MG/100ML; MG/100ML; MG/100ML
INJECTION, SOLUTION INTRAVENOUS CONTINUOUS
Status: DISCONTINUED | OUTPATIENT
Start: 2022-10-17 | End: 2022-10-17 | Stop reason: SDUPTHER

## 2022-10-17 RX ORDER — MISOPROSTOL 200 UG/1
1000 TABLET ORAL PRN
Status: DISCONTINUED | OUTPATIENT
Start: 2022-10-17 | End: 2022-10-17 | Stop reason: SDUPTHER

## 2022-10-17 RX ORDER — METHYLERGONOVINE MALEATE 0.2 MG/ML
200 INJECTION INTRAVENOUS PRN
Status: DISCONTINUED | OUTPATIENT
Start: 2022-10-17 | End: 2022-10-17 | Stop reason: SDUPTHER

## 2022-10-17 RX ORDER — SODIUM CHLORIDE, SODIUM LACTATE, POTASSIUM CHLORIDE, AND CALCIUM CHLORIDE .6; .31; .03; .02 G/100ML; G/100ML; G/100ML; G/100ML
500 INJECTION, SOLUTION INTRAVENOUS PRN
Status: DISCONTINUED | OUTPATIENT
Start: 2022-10-17 | End: 2022-10-17 | Stop reason: SDUPTHER

## 2022-10-17 RX ORDER — OXYCODONE HYDROCHLORIDE 5 MG/1
5 TABLET ORAL EVERY 4 HOURS PRN
Status: DISCONTINUED | OUTPATIENT
Start: 2022-10-17 | End: 2022-10-19 | Stop reason: HOSPADM

## 2022-10-17 RX ORDER — CARBOPROST TROMETHAMINE 250 UG/ML
250 INJECTION, SOLUTION INTRAMUSCULAR PRN
Status: DISCONTINUED | OUTPATIENT
Start: 2022-10-17 | End: 2022-10-17 | Stop reason: SDUPTHER

## 2022-10-17 RX ORDER — SEVOFLURANE 250 ML/250ML
1 LIQUID RESPIRATORY (INHALATION) CONTINUOUS PRN
Status: DISCONTINUED | OUTPATIENT
Start: 2022-10-17 | End: 2022-10-17 | Stop reason: SDUPTHER

## 2022-10-17 RX ORDER — SODIUM CHLORIDE 9 MG/ML
INJECTION, SOLUTION INTRAVENOUS PRN
Status: DISCONTINUED | OUTPATIENT
Start: 2022-10-17 | End: 2022-10-19 | Stop reason: HOSPADM

## 2022-10-17 RX ORDER — MORPHINE SULFATE 2 MG/ML
2 INJECTION, SOLUTION INTRAMUSCULAR; INTRAVENOUS
Status: DISCONTINUED | OUTPATIENT
Start: 2022-10-17 | End: 2022-10-17 | Stop reason: SDUPTHER

## 2022-10-17 RX ORDER — ONDANSETRON 2 MG/ML
8 INJECTION INTRAMUSCULAR; INTRAVENOUS EVERY 6 HOURS PRN
Status: DISCONTINUED | OUTPATIENT
Start: 2022-10-17 | End: 2022-10-17 | Stop reason: SDUPTHER

## 2022-10-17 RX ORDER — IBUPROFEN 800 MG/1
800 TABLET ORAL EVERY 8 HOURS PRN
Status: DISCONTINUED | OUTPATIENT
Start: 2022-10-17 | End: 2022-10-17 | Stop reason: SDUPTHER

## 2022-10-17 RX ORDER — FAMOTIDINE 10 MG/ML
20 INJECTION, SOLUTION INTRAVENOUS 2 TIMES DAILY PRN
Status: DISCONTINUED | OUTPATIENT
Start: 2022-10-17 | End: 2022-10-19 | Stop reason: HOSPADM

## 2022-10-17 RX ORDER — ACETAMINOPHEN 325 MG/1
650 TABLET ORAL EVERY 4 HOURS PRN
Status: DISCONTINUED | OUTPATIENT
Start: 2022-10-17 | End: 2022-10-17 | Stop reason: SDUPTHER

## 2022-10-17 RX ORDER — LIDOCAINE HYDROCHLORIDE 10 MG/ML
30 INJECTION, SOLUTION EPIDURAL; INFILTRATION; INTRACAUDAL; PERINEURAL PRN
Status: DISCONTINUED | OUTPATIENT
Start: 2022-10-17 | End: 2022-10-17 | Stop reason: SDUPTHER

## 2022-10-17 RX ORDER — BUTORPHANOL TARTRATE 1 MG/ML
1 INJECTION, SOLUTION INTRAMUSCULAR; INTRAVENOUS
Status: DISCONTINUED | OUTPATIENT
Start: 2022-10-17 | End: 2022-10-17 | Stop reason: SDUPTHER

## 2022-10-17 RX ORDER — MORPHINE SULFATE 4 MG/ML
4 INJECTION, SOLUTION INTRAMUSCULAR; INTRAVENOUS
Status: DISCONTINUED | OUTPATIENT
Start: 2022-10-17 | End: 2022-10-17 | Stop reason: SDUPTHER

## 2022-10-17 RX ORDER — FAMOTIDINE 20 MG/1
20 TABLET, FILM COATED ORAL 2 TIMES DAILY PRN
Status: DISCONTINUED | OUTPATIENT
Start: 2022-10-17 | End: 2022-10-19 | Stop reason: HOSPADM

## 2022-10-17 RX ORDER — TERBUTALINE SULFATE 1 MG/ML
0.25 INJECTION, SOLUTION SUBCUTANEOUS
Status: DISCONTINUED | OUTPATIENT
Start: 2022-10-17 | End: 2022-10-17 | Stop reason: SDUPTHER

## 2022-10-17 RX ORDER — SODIUM CHLORIDE 0.9 % (FLUSH) 0.9 %
5-40 SYRINGE (ML) INJECTION EVERY 12 HOURS SCHEDULED
Status: DISCONTINUED | OUTPATIENT
Start: 2022-10-17 | End: 2022-10-19 | Stop reason: HOSPADM

## 2022-10-17 RX ORDER — ONDANSETRON 2 MG/ML
4 INJECTION INTRAMUSCULAR; INTRAVENOUS EVERY 6 HOURS PRN
Status: DISCONTINUED | OUTPATIENT
Start: 2022-10-17 | End: 2022-10-19 | Stop reason: HOSPADM

## 2022-10-17 RX ORDER — CALCIUM CARBONATE 200(500)MG
1000 TABLET,CHEWABLE ORAL 3 TIMES DAILY PRN
Status: DISCONTINUED | OUTPATIENT
Start: 2022-10-17 | End: 2022-10-19 | Stop reason: HOSPADM

## 2022-10-17 RX ORDER — DIPHENHYDRAMINE HYDROCHLORIDE 50 MG/ML
25 INJECTION INTRAMUSCULAR; INTRAVENOUS EVERY 4 HOURS PRN
Status: DISCONTINUED | OUTPATIENT
Start: 2022-10-17 | End: 2022-10-17 | Stop reason: SDUPTHER

## 2022-10-17 RX ORDER — SODIUM CHLORIDE, SODIUM LACTATE, POTASSIUM CHLORIDE, CALCIUM CHLORIDE 600; 310; 30; 20 MG/100ML; MG/100ML; MG/100ML; MG/100ML
INJECTION, SOLUTION INTRAVENOUS CONTINUOUS
Status: DISCONTINUED | OUTPATIENT
Start: 2022-10-17 | End: 2022-10-19 | Stop reason: HOSPADM

## 2022-10-17 RX ORDER — SODIUM CHLORIDE 0.9 % (FLUSH) 0.9 %
5-40 SYRINGE (ML) INJECTION PRN
Status: DISCONTINUED | OUTPATIENT
Start: 2022-10-17 | End: 2022-10-17 | Stop reason: SDUPTHER

## 2022-10-17 RX ORDER — DOCUSATE SODIUM 100 MG/1
100 CAPSULE, LIQUID FILLED ORAL 2 TIMES DAILY PRN
Status: DISCONTINUED | OUTPATIENT
Start: 2022-10-17 | End: 2022-10-19 | Stop reason: HOSPADM

## 2022-10-17 RX ORDER — MODIFIED LANOLIN
OINTMENT (GRAM) TOPICAL PRN
Status: DISCONTINUED | OUTPATIENT
Start: 2022-10-17 | End: 2022-10-19 | Stop reason: HOSPADM

## 2022-10-17 RX ORDER — SODIUM CHLORIDE, SODIUM LACTATE, POTASSIUM CHLORIDE, AND CALCIUM CHLORIDE .6; .31; .03; .02 G/100ML; G/100ML; G/100ML; G/100ML
1000 INJECTION, SOLUTION INTRAVENOUS PRN
Status: DISCONTINUED | OUTPATIENT
Start: 2022-10-17 | End: 2022-10-17 | Stop reason: SDUPTHER

## 2022-10-17 RX ORDER — INSULIN LISPRO 100 [IU]/ML
0-8 INJECTION, SOLUTION INTRAVENOUS; SUBCUTANEOUS
Status: DISCONTINUED | OUTPATIENT
Start: 2022-10-17 | End: 2022-10-17 | Stop reason: SDUPTHER

## 2022-10-17 RX ORDER — FERROUS SULFATE 325(65) MG
325 TABLET ORAL
Status: DISCONTINUED | OUTPATIENT
Start: 2022-10-18 | End: 2022-10-19 | Stop reason: HOSPADM

## 2022-10-17 RX ORDER — IBUPROFEN 800 MG/1
800 TABLET ORAL EVERY 8 HOURS PRN
Status: DISCONTINUED | OUTPATIENT
Start: 2022-10-17 | End: 2022-10-19 | Stop reason: HOSPADM

## 2022-10-17 RX ORDER — MAGNESIUM SULFATE IN WATER 40 MG/ML
4000 INJECTION, SOLUTION INTRAVENOUS ONCE
Status: COMPLETED | OUTPATIENT
Start: 2022-10-17 | End: 2022-10-17

## 2022-10-17 RX ORDER — ONDANSETRON 4 MG/1
8 TABLET, ORALLY DISINTEGRATING ORAL EVERY 8 HOURS PRN
Status: DISCONTINUED | OUTPATIENT
Start: 2022-10-17 | End: 2022-10-19 | Stop reason: HOSPADM

## 2022-10-17 RX ORDER — INSULIN LISPRO 100 [IU]/ML
0-4 INJECTION, SOLUTION INTRAVENOUS; SUBCUTANEOUS NIGHTLY
Status: DISCONTINUED | OUTPATIENT
Start: 2022-10-17 | End: 2022-10-19 | Stop reason: HOSPADM

## 2022-10-17 RX ORDER — OXYCODONE HYDROCHLORIDE 5 MG/1
10 TABLET ORAL EVERY 4 HOURS PRN
Status: DISCONTINUED | OUTPATIENT
Start: 2022-10-17 | End: 2022-10-19 | Stop reason: HOSPADM

## 2022-10-17 RX ORDER — MISOPROSTOL 200 UG/1
TABLET ORAL
Status: DISCONTINUED
Start: 2022-10-17 | End: 2022-10-17 | Stop reason: SDUPTHER

## 2022-10-17 RX ORDER — TRANEXAMIC ACID 10 MG/ML
1000 INJECTION, SOLUTION INTRAVENOUS
Status: DISCONTINUED | OUTPATIENT
Start: 2022-10-17 | End: 2022-10-17 | Stop reason: SDUPTHER

## 2022-10-17 RX ORDER — CALCIUM GLUCONATE 94 MG/ML
1000 INJECTION, SOLUTION INTRAVENOUS PRN
Status: DISCONTINUED | OUTPATIENT
Start: 2022-10-17 | End: 2022-10-19 | Stop reason: HOSPADM

## 2022-10-17 RX ADMIN — INSULIN HUMAN 20 UNITS: 100 INJECTION, SUSPENSION SUBCUTANEOUS at 22:11

## 2022-10-17 RX ADMIN — MAGNESIUM SULFATE HEPTAHYDRATE 4000 MG: 40 INJECTION, SOLUTION INTRAVENOUS at 21:00

## 2022-10-17 RX ADMIN — MAGNESIUM SULFATE HEPTAHYDRATE 2000 MG/HR: 40 INJECTION, SOLUTION INTRAVENOUS at 21:20

## 2022-10-17 RX ADMIN — Medication 1 MILLI-UNITS/MIN: at 07:17

## 2022-10-17 RX ADMIN — MISOPROSTOL 1000 MCG: 200 TABLET ORAL at 17:29

## 2022-10-17 RX ADMIN — BUTORPHANOL TARTRATE 1 MG: 1 INJECTION, SOLUTION INTRAMUSCULAR; INTRAVENOUS at 12:18

## 2022-10-17 RX ADMIN — Medication 166.7 ML: at 17:26

## 2022-10-17 RX ADMIN — SODIUM CHLORIDE, POTASSIUM CHLORIDE, SODIUM LACTATE AND CALCIUM CHLORIDE: 600; 310; 30; 20 INJECTION, SOLUTION INTRAVENOUS at 06:24

## 2022-10-17 RX ADMIN — ONDANSETRON 8 MG: 2 INJECTION INTRAMUSCULAR; INTRAVENOUS at 14:45

## 2022-10-17 RX ADMIN — INSULIN HUMAN 10 UNITS: 100 INJECTION, SUSPENSION SUBCUTANEOUS at 07:27

## 2022-10-17 RX ADMIN — SODIUM CHLORIDE, POTASSIUM CHLORIDE, SODIUM LACTATE AND CALCIUM CHLORIDE: 600; 310; 30; 20 INJECTION, SOLUTION INTRAVENOUS at 19:17

## 2022-10-17 RX ADMIN — SODIUM CHLORIDE, POTASSIUM CHLORIDE, SODIUM LACTATE AND CALCIUM CHLORIDE: 600; 310; 30; 20 INJECTION, SOLUTION INTRAVENOUS at 13:05

## 2022-10-17 ASSESSMENT — PAIN DESCRIPTION - DESCRIPTORS: DESCRIPTORS: ACHING;CRAMPING

## 2022-10-17 ASSESSMENT — PAIN DESCRIPTION - LOCATION: LOCATION: ABDOMEN

## 2022-10-17 ASSESSMENT — PAIN SCALES - GENERAL: PAINLEVEL_OUTOF10: 6

## 2022-10-17 NOTE — L&D DELIVERY NOTE
Department of Obstetrics and Gynecology  Spontaneous Vaginal Delivery Note      Pt Name: Freida Alanis  MRN: [id number] [place] #: [de-identified]  YOB: 2000  Procedure Performed By: Kedar Lopez DO        Pre-operative Diagnosis:  Term pregnancy, Induced labor, Single fetus, and Pregnancy complicated by: poorly controlled type II DM, Pre-E without severe features    Post-operative Diagnosis: Same, delivered. Procedure: spontaneous vaginal delivery    Surgeon:  Michael Lobo    Information for the patient's :  Marianna Hernandez [[id number]]        Anesthesia:  none    Estimated blood loss:  151 ml    Complications:  Approximately 1.5 minute shoulder dystocia    Condition:  infant stable to special care nursery and mother stable    Delivery Summary:  The patient is a 25 y.o. Virgdottie Aguilera at 37w2d who was admitted for induction. She received the following interventions: AROM and IV Pitocin induction. The patient progressed well,did not receive an epidural, became complete and started to push. She was placed in the dorsal lithotomy position and prepped. She delivered the head. The anterior shoulder did not deliver easily with maternal pushing efforts and gentle downward traction. Pt was then placed in McRobert's position and suprapubic pressure was applied. Pt was instructed to continue pushing. I attempted to reach in for the posterior shoulder. There was not enough room to get ahold of the posterior arm. I then reached in anteriorly to try to rotate the anterior shoulder. Again there was limited room to perform [employer] or [employer]. Eventually with Pari and suprapubic pressure and better maternal pushing effort, the anterior shoulder and the rest of the baby delivered. The baby was then placed on the mother's abdomen. Cord was clamped and cut and infant handed off to the waiting nurse for evaluation. Cord gasses were collected.  The placenta delivered intact, whole and that the umbilical cord had three vessels noted. Some uterine atony was noted and Cytotec was placed per rectum. The perineum and vagina were explored and a second degree and left sulcal lacerations were repaired with Chromic 2.0. Dr. Sara Flannery arrived after delivery of the baby and assisted with vaginal laceration repair. Rectal exam was performed after repair to confirm the rectum was intact and no suture through the rectal mucosa. Vaginal sweep was performed at the conclusion of delivery and all needles were taken off the field.  Sponge counts correct      PMH:  Past Medical History:   Diagnosis Date    Anemia     takes extra iron    Depression     Gestational hypertension     on meds    Insulin controlled gestational diabetes mellitus (GDM) during pregnancy          Cassidy Humphreys DO

## 2022-10-17 NOTE — FLOWSHEET NOTE
Arom by dr Goldy Queen with amnihook, clear fluid, did ultrasound prior to rom and verified cephalic presentation,plan of care discussed

## 2022-10-17 NOTE — FLOWSHEET NOTE
Dr Hector Joyner notified of pt ant rim for  awhile, had variables earlier with a position change, now had a prolonged decel, pitocin is off, getting fluid bolus, baby does not tolerate position changes, no epidural, had stadol and zofran

## 2022-10-17 NOTE — H&P
[address] [address]  History and Physical Update    Pt Name: Shauna Brand  MRN: [id number]  YOB: 2000  Date of evaluation: 10/17/2022    [x] I have examined the patient and reviewed the H&P/Consult and there are no changes to the patient or plans. 20yo G1 @37w2d admitted for scheduled IOL due to Pre-E without severe features, controlled with Procardia 60XL this pregnancy and Type II DM with fasting blood sugars that we have struggled to control this pregnancy. Pt has been on 40u NPH at night and 20u fast acting insulin with meals. Gave 10u NPH this AM. Will check blood sugars q2hrs and correct with sliding scale during labor. Pt has no complaints this AM.  Vertex presentation confirmed with bedside US. SVE /-2. GBS negative. AROM clear @0820 with FSE. FHTs category 1. Ctx q2-3 minutes. EFW ~8lbs. Will plan for 24hrs magnesium postpartum for seizure ppx. Continue to augment with pitocin. Anticipate . [] I have examined the patient and reviewed the H&P/Consult and have noted the following changes:       Discussion with the patient and/ or family for proposed care, treatment, services; benefits, risks, side effects; likelihood of achieving goals and potential problems that may occur during recuperation was had and all questions were answered. Discussion with the patient and/ or family of reasonable alternatives to the proposed care, treatment, services and the discussion of the risks, benefits, side effects related to the alternatives and the risk related to not receiving the proposed care treatment services was also had and all questions were answered. If this is for an elective surgical procedure then The patient was counseled at length about the risks of edith Covid-19 during their perioperative period and any recovery window from their procedure.   The patient was made aware that edith Covid-19  may worsen their prognosis for recovering from their procedure  and lend to a higher morbidity and/or mortality risk. All material risks, benefits, and reasonable alternatives including postponing the procedure were discussed. The patient  does wish to proceed with the procedure at this time.              Kj Garcia DO  Electronically signed 10/17/2022 at 8:26 AM

## 2022-10-17 NOTE — FLOWSHEET NOTE
Dr Venus Bose phoned in and notified of pt arrival for scheduled induction, gestational hypertension, asymptomatic, bp 121/63, gestational hypertension on insulin, blood sugar on admission 194, took insulin last evening at supper and bedtime, ate yogurt and 2 granola bars this am, -gbs, cervix 4 cm with bulging membranes, presenting part feels cephalic, (but not 853% sure due to bulging membranes).  Doctor Venus Bose gave orders for blood sugar checks every 2 hours, will start magnesium sulfate after delivery

## 2022-10-17 NOTE — FLOWSHEET NOTE
G1 37w2d patient of Dr. Kishan Hudson presents to L&D for scheduled IOL for DM2 and pre-E without severe features. Patient notes positive fetal movement, denies vaginal bleeding or leaking of fluids, denies regular contractions. Gown given to change into. Patient to bathroom to void, informed of maternal drug testing policy in place on all laboring patients. Verbal consent received, paper consent to be signed and urine to be sent. Explained patients right to have family, representative or physician notified of their admission. Patient has Declined for physician to be notified. Patient has Declined for family/representative to be notified.

## 2022-10-17 NOTE — FLOWSHEET NOTE
Orders to called out of room to call Dr Reji Moore. Dr Reji Moore called directly on her cell to come now for help with shoulder dystocia in room 4. Md on her way over.

## 2022-10-18 LAB
GLUCOSE BLD-MCNC: 117 MG/DL (ref 70–108)
GLUCOSE BLD-MCNC: 121 MG/DL (ref 70–108)
GLUCOSE BLD-MCNC: 81 MG/DL (ref 70–108)
GLUCOSE BLD-MCNC: 85 MG/DL (ref 70–108)

## 2022-10-18 PROCEDURE — 1200000000 HC SEMI PRIVATE

## 2022-10-18 PROCEDURE — 6370000000 HC RX 637 (ALT 250 FOR IP): Performed by: STUDENT IN AN ORGANIZED HEALTH CARE EDUCATION/TRAINING PROGRAM

## 2022-10-18 PROCEDURE — 2580000003 HC RX 258: Performed by: STUDENT IN AN ORGANIZED HEALTH CARE EDUCATION/TRAINING PROGRAM

## 2022-10-18 PROCEDURE — 6360000002 HC RX W HCPCS: Performed by: STUDENT IN AN ORGANIZED HEALTH CARE EDUCATION/TRAINING PROGRAM

## 2022-10-18 PROCEDURE — 82948 REAGENT STRIP/BLOOD GLUCOSE: CPT

## 2022-10-18 RX ORDER — INSULIN LISPRO 100 [IU]/ML
10 INJECTION, SOLUTION INTRAVENOUS; SUBCUTANEOUS
Status: DISCONTINUED | OUTPATIENT
Start: 2022-10-18 | End: 2022-10-19 | Stop reason: HOSPADM

## 2022-10-18 RX ADMIN — IBUPROFEN 800 MG: 800 TABLET, FILM COATED ORAL at 20:08

## 2022-10-18 RX ADMIN — ACETAMINOPHEN 1000 MG: 500 TABLET ORAL at 20:07

## 2022-10-18 RX ADMIN — SODIUM CHLORIDE, POTASSIUM CHLORIDE, SODIUM LACTATE AND CALCIUM CHLORIDE: 600; 310; 30; 20 INJECTION, SOLUTION INTRAVENOUS at 06:49

## 2022-10-18 RX ADMIN — INSULIN LISPRO 10 UNITS: 100 INJECTION, SOLUTION INTRAVENOUS; SUBCUTANEOUS at 18:07

## 2022-10-18 RX ADMIN — MAGNESIUM SULFATE HEPTAHYDRATE 2000 MG/HR: 40 INJECTION, SOLUTION INTRAVENOUS at 07:26

## 2022-10-18 RX ADMIN — SODIUM CHLORIDE, PRESERVATIVE FREE 10 ML: 5 INJECTION INTRAVENOUS at 20:41

## 2022-10-18 RX ADMIN — INSULIN LISPRO 10 UNITS: 100 INJECTION, SOLUTION INTRAVENOUS; SUBCUTANEOUS at 08:35

## 2022-10-18 RX ADMIN — INSULIN LISPRO 10 UNITS: 100 INJECTION, SOLUTION INTRAVENOUS; SUBCUTANEOUS at 13:03

## 2022-10-18 RX ADMIN — IBUPROFEN 800 MG: 800 TABLET, FILM COATED ORAL at 10:35

## 2022-10-18 RX ADMIN — DOCUSATE SODIUM 100 MG: 100 CAPSULE, LIQUID FILLED ORAL at 20:08

## 2022-10-18 ASSESSMENT — PAIN DESCRIPTION - FREQUENCY: FREQUENCY: INTERMITTENT

## 2022-10-18 ASSESSMENT — PAIN DESCRIPTION - DESCRIPTORS: DESCRIPTORS: DISCOMFORT;ACHING

## 2022-10-18 ASSESSMENT — PAIN DESCRIPTION - ONSET: ONSET: ON-GOING

## 2022-10-18 ASSESSMENT — PAIN DESCRIPTION - PAIN TYPE: TYPE: ACUTE PAIN

## 2022-10-18 ASSESSMENT — PAIN SCALES - GENERAL
PAINLEVEL_OUTOF10: 1
PAINLEVEL_OUTOF10: 3
PAINLEVEL_OUTOF10: 3

## 2022-10-18 ASSESSMENT — PAIN DESCRIPTION - LOCATION
LOCATION: ABDOMEN
LOCATION: GENERALIZED

## 2022-10-18 ASSESSMENT — PAIN - FUNCTIONAL ASSESSMENT: PAIN_FUNCTIONAL_ASSESSMENT: ACTIVITIES ARE NOT PREVENTED

## 2022-10-18 NOTE — FLOWSHEET NOTE
Pt transferred to [facility] via bed to visit infant son. GAGE Quiroz at bedside, supportive.

## 2022-10-18 NOTE — FLOWSHEET NOTE
Magnesium infusion discontinued at 1700 per Dr. Miles Sep direction. IV to INT, paul out. Za care provided, medicated spray to perineum, clean pad and underwear applied. Patient denies dizziness or nausea. Patient requested to put pjs on. Assisted patient with dressing. Up to wheelchair. Patient tolerated well. Patient transported in stable condition via wheelchair to [facility]. Report given at bedside to Lacy Bacon. KATELIN. Belongings and pump transported to room by family.

## 2022-10-18 NOTE — LACTATION NOTE
To room to set up pump. Pump teaching provided. Breast feeding booklet provided. Discussed frequency and duration of pumping to support milk supply. Discussed breast milk storage and proper labeling. Pt states no other questions at this time. Will follow up PRN.

## 2022-10-18 NOTE — PLAN OF CARE
Problem: Pain  Goal: Verbalizes/displays adequate comfort level or baseline comfort level  10/18/2022 0735 by Romeo Son RN  Outcome: Progressing  Flowsheets (Taken 10/18/2022 8332)  Verbalizes/displays adequate comfort level or baseline comfort level:   Encourage patient to monitor pain and request assistance   Assess pain using appropriate pain scale   Implement non-pharmacological measures as appropriate and evaluate response   Administer analgesics based on type and severity of pain and evaluate response     Problem: Infection - Adult  Goal: Absence of infection during hospitalization  10/18/2022 0735 by Romeo Son RN  Outcome: Progressing  Flowsheets (Taken 10/18/2022 0735)  Absence of infection during hospitalization:   Assess and monitor for signs and symptoms of infection   Monitor lab/diagnostic results   Monitor all insertion sites i.e., indwelling lines, tubes and drains   Administer medications as ordered   Instruct and encourage patient and family to use good hand hygiene technique     Problem: Safety - Adult  Goal: Free from fall injury  10/18/2022 0735 by Romeo Son RN  Outcome: Progressing  Flowsheets (Taken 10/18/2022 0735)  Free From Fall Injury: Instruct family/caregiver on patient safety     Problem: Discharge Planning  Goal: Discharge to home or other facility with appropriate resources  10/18/2022 0735 by Romeo Son RN  Outcome: Progressing  Flowsheets (Taken 10/18/2022 3303)  Discharge to home or other facility with appropriate resources:   Identify barriers to discharge with patient and caregiver   Arrange for needed discharge resources and transportation as appropriate     Problem: Chronic Conditions and Co-morbidities  Goal: Patient's chronic conditions and co-morbidity symptoms are monitored and maintained or improved  10/18/2022 0735 by Romeo Son RN  Outcome: Progressing  Flowsheets (Taken 10/18/2022 0735)  Care Plan - Patient's Chronic Conditions and Co-Morbidity Symptoms are Monitored and Maintained or Improved: Monitor and assess patient's chronic conditions and comorbid symptoms for stability, deterioration, or improvement    Care plan reviewed with patient and significant other. Patient and significant other  verbalize understanding of the plan of care and contribute to goal setting.

## 2022-10-18 NOTE — FLOWSHEET NOTE
Pericare explained and performed. Fresh pad and mesh undies applied. Pt tolerated well. Denies pain at this time.

## 2022-10-18 NOTE — PLAN OF CARE
Problem: Pain  Goal: Verbalizes/displays adequate comfort level or baseline comfort level  10/18/2022 1839 by Addison Hopkins RN  Outcome: Progressing  10/18/2022 0735 by Maya Isaacs RN  Outcome: Progressing  Flowsheets (Taken 10/18/2022 1656)  Verbalizes/displays adequate comfort level or baseline comfort level:   Encourage patient to monitor pain and request assistance   Assess pain using appropriate pain scale   Implement non-pharmacological measures as appropriate and evaluate response   Administer analgesics based on type and severity of pain and evaluate response     Problem: Infection - Adult  Goal: Absence of infection during hospitalization  10/18/2022 1839 by Addison Hopkins RN  Outcome: Progressing  10/18/2022 0735 by Maya Isaacs RN  Outcome: Progressing  Flowsheets (Taken 10/18/2022 0735)  Absence of infection during hospitalization:   Assess and monitor for signs and symptoms of infection   Monitor lab/diagnostic results   Monitor all insertion sites i.e., indwelling lines, tubes and drains   Administer medications as ordered   Instruct and encourage patient and family to use good hand hygiene technique     Problem: Safety - Adult  Goal: Free from fall injury  10/18/2022 1839 by Addison Hopkins RN  Outcome: Progressing  10/18/2022 0735 by Maya Isaacs RN  Outcome: Progressing  Flowsheets (Taken 10/18/2022 0735)  Free From Fall Injury: Instruct family/caregiver on patient safety     Problem: Discharge Planning  Goal: Discharge to home or other facility with appropriate resources  10/18/2022 1839 by Addison Hopkins RN  Outcome: Progressing  10/18/2022 0735 by Maya Isaacs RN  Outcome: Progressing  Flowsheets (Taken 10/18/2022 6607)  Discharge to home or other facility with appropriate resources:   Identify barriers to discharge with patient and caregiver   Arrange for needed discharge resources and transportation as appropriate     Problem: Chronic Conditions and Co-morbidities  Goal: Patient's chronic conditions and co-morbidity symptoms are monitored and maintained or improved  10/18/2022 1839 by Yuri Bedolla RN  Outcome: Progressing  10/18/2022 0735 by Romeo Son RN  Outcome: Progressing  Flowsheets (Taken 10/18/2022 0735)  Care Plan - Patient's Chronic Conditions and Co-Morbidity Symptoms are Monitored and Maintained or Improved: Monitor and assess patient's chronic conditions and comorbid symptoms for stability, deterioration, or improvement   Care plan reviewed with patient and she contributes to goal setting and voices understanding of plan of care.

## 2022-10-18 NOTE — FLOWSHEET NOTE
[employer] in room at bedside. Outpatient Speech Language Pathology   Adult Speech Language Cognitive Treatment Note  [facility]     Patient Name: Aj Marie  : 1943  MRN: [id number]  Today's Date: 2020         Visit Date: 2020   Patient Active Problem List   Diagnosis   • First degree atrioventricular block   • OA (osteoarthritis)   • Hearing loss   • Essential hypertension   • Candidate for statin therapy due to risk of future cardiovascular event   • Atypical chest pain   • Mild cognitive impairment          Visit Dx:    ICD-10-CM ICD-9-CM   1. Mild cognitive impairment  G31.84 331.83                         SLP OP Goals     Row Name 20 0900          Goal Type Needed    Goal Type Needed  Memory;Attention/Orientation;Other Adult Goals  -HG        Subjective Comments    Subjective Comments  Pt alert, cooperative, returned with no journal.   -HG        Memory Goals    Patient will be able to remember information needed to return to work and function on work-related tasks  90%:;with intermittent cues  -HG     Status: Patient will be able to remember information needed to return to work and function on work-related tasks  New  -HG     Patient will demonstrate improved ability to recall information by immediately recalling a series of words  80%:;related;after delay;with cues  -HG     Status: Patient will demonstrate improved ability to recall information by immediately recalling a series of words  Progressing as expected  -HG     Comments: Patient will demonstrate improved ability to recall information by immediately recalling a series of words  20: Immediate recall of picture scene: pt was 70% accurate.  20: Immediate recall of a picture scene and pt was 70% accurate with min cues. 20: RBANS Immediate recall score: Pt improved to an 81 from a 73. 10/15/20: Immediate recall of visuospatial Constructional task and pt was 80% accurate with min cues. 10/1/20: ROMIE completed this date and pt did  exhibit at least one significant error. 9/17/20: Immediate recall of word placement for 4 words: pt was 100% accurate. Hmwk for 5 words.  9/3/20: 4 related words from homework: pt was 2/4 I'ly. Moved to un-related pictures for visual ease and pt was 2/4. 8/27/20: 4 related words and pt was 1/4 and improved to 3/4 and 4/4 with mneumonic device.   -HG     Patient’s memory skills will be enhanced as reported by patient by utilizing internal memory strategies to recall up to 3 pieces of information after a 5- minute delay  80%:;with cues  -HG     Status: Patient’s memory skills will be enhanced as reported by patient by utilizing internal memory strategies to recall up to 3 pieces of information after a 5- minute delay  Progressing as expected  -HG     Comments: Patient’s memory skills will be enhanced as reported by patient by utilizing internal memory strategies to recall up to 3 pieces of information after a 5- minute delay  12/17/20: Delayed recall of 5 un-related words: pt was 3/5 with use of visual prompts.  12/10/20: Delayed recall of 4 un-related words from homework and pt had no studied them and SLP reviewed and with a delay, pt was 3/4 consistently. 12/3/20: Delayed recall for 3 un-related and pt was 2/3 x 2 with review and with use of a sentence, pt was 3/3. 11/19/20: Delayed recall of 3 un-related words: pt was 2/3 with cues x 4. 11/12/20: Delayed recall of 8 related pictures from homework: pt was 8/8 x 2. 11/5/20: RBANS Delayed recall: pt was improved to a 79 from a 60.  10/29/20: Delayed recall of 6 related pictures: pt was 4/6. With use of first letter initials, pt was 6/6 accurate. 7th word added: pt was 7/7 x 3.   10/22/20: Delayed recall of 5 related pictures and pt was 5/5, 4/5, 5/5 x 2.  Added a sixth picture and pt was 5/6 . 10/15/20: Delayed recall of 4 related picture relative to pt and pt was 3/4 x 3. 10/1/20: FROMAJE: Delayed recall also exhibited at least one significant error. 9/17/20: 4  "related words from homework and pt was 1/4- changed to 4 words that pertain to pt and he was 3/4.  9/10/20: 4 un-related pictures from homework: pt was 3/4.  2/4- moved to 4 related pictures and pt was 0/4 I'ly. With cues, pt was 3/4.   -HG     Patient’s memory skills will be enhanced as reported by patient by using external memory aides  80%:;with cues  -HG     Status: Patient’s memory skills will be enhanced as reported by patient by using external memory aides  Progressing as expected  -HG     Comments: Patient’s memory skills will be enhanced as reported by patient by using external memory aides  12/17/20: Pt reports writing in journal daily but did not return with journal to session. 12/3/20: Pt reports writing in journal every other day. 11/19/20: Pt states \"a little bit.\" 11/12/20: Education with pt for use of journal and bringing it to next therapy session.  11/5/20: Education and expectation of using his journal daily and returning to therapy with it next week. 10/29/20: cont'd education for use of daily journal. Provided a visual prompt.   10/15/20: Pt reports writing a little- pt states that he doesn't write very well.  10/1/20: Significant education with pt and wife regarding use of journal and/or notes lavon on phone. 9/17/20: Pt writing in journal but not in great detail and not consistent- education ongoing.  9/10/20: Pt stated that he has written in his journal a little. Provided visual prompts to remind pt to write in his journal. 9/3/20: Pt states that he journaling \"a little\". 8/27/20: Education for use of journal and the need for his delayed recall.   -HG     Patient will demonstrate improved ability to recall information by stating activities patient has completed that day  80%:;with cues  -HG     Status: Patient will demonstrate improved ability to recall information by stating activities patient has completed that day  Progressing as expected  -HG     Comments: Patient will demonstrate improved " "ability to recall information by stating activities patient has completed that day  11/12/20: Pt states that he does the same thing every day. 10/22/20: When asked about his weekend, he was able to provide general information, not specifics of places or things seen. 10/1/20: Thorough education with pt and wife regarding use of journal. Pt admitted having to admit to his deficits. 8/27/20: Education for recall of what he has done throughout the day. Pt states that he never really worried about these things, \"That's why I had 2 secretaries.\"   -HG        Attention/Orientation Goals    Patient will be able to execute all activities necessary to manage a home  Independently;With Cues  -HG     Status: Patient will be able to execute all activities necessary to manage a home  Progressing as expected  -HG     Comments: Patient will be able to execute all activities necessary to manage a home  11/5/20: RBANS Attention Score: Improved to a 103 from an 88. 10/1/20: FROMJE for attention and pt exhibited fast processing with minimal errors.   -HG     Patient will improve attention skills by sustaining consistent behavioral response during continuous and repetitive activity (e.g., listening for target words, auditory/reading comprehension tasks)  with cues;10 minute task  -HG     Status: Patient will improve attention skills by sustaining consistent behavioral response during continuous and repetitive activity (e.g., listening for target words, auditory/reading comprehension tasks)  Progressing as expected  -HG     Comments: Patient will improve attention skills by sustaining consistent behavioral response during continuous and repetitive activity (e.g., listening for target words, auditory/reading comprehension tasks)  12/10/20: APT TEST: Pt scored above average for all areas except Divided attention and that was below average. Pt very concerned it can't remember more than he does. 10/22/20: Recognition of cards presented " quickly: pt was nearly 100% accurate. 9/10/20: Card ID for a card that is one less than one before it:   -HG     Patient will improve attention skills by sustaining focus in order to actively hold and manipulate information provided (e.g., sequencing auditorily presented number series in ascending or descending order)  80%:;with cues  -HG     Status: Patient will improve attention skills by sustaining focus in order to actively hold and manipulate information provided (e.g., sequencing auditorily presented number series in ascending or descending order)  Progressing as expected  -HG     Comments: Patient will improve attention skills by sustaining focus in order to actively hold and manipulate information provided (e.g., sequencing auditorily presented number series in ascending or descending order)  12/3/20: Mental Manipulation for word progression: pt was 90% accurate.  10/22/20: Digit Span: pt continues to be successful with 6 digits. 7 digits: 6/7. 9/3/20: Digit Span: pt was able to recall up to 6 digits.   -HG     Patient will improve attention skills by alternating or shifting focus between two different tasks in order to complete both tasks  80%:;with cues  -HG     Status: Patient will improve attention skills by alternating or shifting focus between two different tasks in order to complete both tasks  Progressing as expected  -HG     Comments: Patient will improve attention skills by alternating or shifting focus between two different tasks in order to complete both tasks  12/10/20: Card Sorting by N and pt was less impulsive and used his own strategy for sorting and pt was 80% accurate. 11/12/20: Card sorting by N and suit: pt was 80-90% accurate with 2 run throughs.  10/22/20: Alternating attention for cards with N and into their suit and pt was 80% accurate. 9/3/20: Alternating attention for cards with N and sorting into suit and pt was 50% accurate.   -HG     Patient will improve attention skills by  dividing focus and responding simultaneously to multiple tasks or in order to complete task  80%:;with cues  -HG     Status: Patient will improve attention skills by dividing focus and responding simultaneously to multiple tasks or in order to complete task  Progressing as expected  -HG     Comments: Patient will improve attention skills by dividing focus and responding simultaneously to multiple tasks or in order to complete task  12/3/20: Alternating attention for visual scanning and pt's impulsivity plays ar role but pt improved to 90% accurate.  10/29/20: Card sorting based on order per suit and high to low. 9/10/20: Divided attention for card sorting according to suit and specific date: pt was 90% accurate.   -HG     Patient will improve attention skills by sustaining focus to high-level cognitive tasks in order to complete task  80%:;with cues  -HG     Status: Patient will improve attention skills by sustaining focus to high-level cognitive tasks in order to complete task  Progressing as expected  -HG     Comments: Patient will improve attention skills by sustaining focus to high-level cognitive tasks in order to complete task  12/3/20: One Pile Card Game: pt was 80% accurate with mod cues. Pt continues to be impulsive when playing Independently and was 70% accurate.  11/19/20: One Pile Card Game: pt was impulsive and required mod cues and was 80% accurate.  10/15/20: One Pile Card Game: reviewed before play: pt was 80%accurate. 8/27/20: One Pile Card Game: Pt was 75% accurate.   -HG        Other Goals    Other Adult Goal- 1  Pt will improve RBANS Total to at least a 90 placing pt in the  Average range.   -HG     Status: Other Adult Goal- 1  Progressing as expected;Revised  -HG     Comments: Other Adult Goal- 1  11/5/30: RBANS Total of 82 improved from a 77. 10/1/20: FROMAJE: Pt score a 13- Stage 4.3. 9/10/20: RBANS not re-administered this date, pt given SLUMS and scored a 19/30.   -HG     Other Adult Goal- 2   Pt will complete high level reasoning tasks and problem solving with at least 80% accuracy.  -HG     Status: Other Adult Goal- 2  New;Progressing as expected  -HG     Comments: Other Adult Goal- 2  12/17/20: Language and reasoning for game and pt was 90% accurate.  10/22/20: Deduction puzzle: pt was 70% accurate.    -        SLP Time Calculation    SLP Goal Re-Cert Due Date  01/02/21  -HG       User Key  (r) = Recorded By, (t) = Taken By, (c) = Cosigned By    Initials Name Provider Type    Candy Bello MS CCC-SLP Speech and Language Pathologist          OP SLP Education     Row Name 12/17/20 0900       Education    Education Comments  Extensive education this date regarding memory and cognitive decline and the Global Deterioriation Scale and what to expect and how to proceed to moving forward.   -HG      User Key  (r) = Recorded By, (t) = Taken By, (c) = Cosigned By    Initials Name Effective Dates    Candy Bello MS CCC-SLP 08/09/20 -           OP SLP Assessment/Plan - 12/17/20 0900        SLP Plan    Plan Comments  Cont with Cog tx with plan to re-assess at next session and determine ongoing sessions in 2021.    -      User Key  (r) = Recorded By, (t) = Taken By, (c) = Cosigned By    Initials Name Provider Type    Candy Bello MS CCC-SLP Speech and Language Pathologist                 Time Calculation:   SLP Start Time: 0900    Therapy Charges for Today     Code Description Service Date Service Provider Modifiers Qty    [id number]  ST TREATMENT SPEECH 4 12/17/2020 Candy Rubin MS CCC-SLP GN 1                   Candy Rubin MS CCC-SLP  12/17/2020

## 2022-10-18 NOTE — FLOWSHEET NOTE
Dr. Rahul Lowe returned call to unit. Updated on pt status and hourly BP with mag checks. No new orders received at this time.

## 2022-10-18 NOTE — FLOWSHEET NOTE
Pt transferred back to her room via bed, tearful about infant son being transferred to higher acuity facility. RN offered emotional support. FOB at bedside, also supportive.

## 2022-10-18 NOTE — PLAN OF CARE
Problem: Pain  Goal: Verbalizes/displays adequate comfort level or baseline comfort level  10/18/2022 0158 by David Cabral RN  Outcome: Progressing  Flowsheets (Taken 10/17/2022 0753 by Eula Hassan RN)  Verbalizes/displays adequate comfort level or baseline comfort level:   Encourage patient to monitor pain and request assistance   Assess pain using appropriate pain scale     Problem: Infection - Adult  Goal: Absence of infection during hospitalization  10/18/2022 0158 by Davdi Cabral RN  Outcome: Progressing  Flowsheets (Taken 10/17/2022 0753 by Eula Hassan RN)  Absence of infection during hospitalization:   Assess and monitor for signs and symptoms of infection   Monitor lab/diagnostic results   Monitor all insertion sites i.e., indwelling lines, tubes and drains     Problem: Safety - Adult  Goal: Free from fall injury  10/18/2022 0158 by David Cabral RN  Outcome: Progressing  Flowsheets (Taken 10/17/2022 0753 by Eula Hassan RN)  Free From Fall Injury: Instruct family/caregiver on patient safety     Problem: Discharge Planning  Goal: Discharge to home or other facility with appropriate resources  10/18/2022 0158 by David Cabral RN  Outcome: Progressing  Flowsheets (Taken 10/17/2022 0753 by Eula Hassan RN)  Discharge to home or other facility with appropriate resources:   Identify barriers to discharge with patient and caregiver   Identify discharge learning needs (meds, wound care, etc)     Problem: Chronic Conditions and Co-morbidities  Goal: Patient's chronic conditions and co-morbidity symptoms are monitored and maintained or improved  10/18/2022 0158 by David Cabral RN  Outcome: Progressing  Flowsheets (Taken 10/17/2022 0753 by Eula Hassan RN)  Care Plan - Patient's Chronic Conditions and Co-Morbidity Symptoms are Monitored and Maintained or Improved: Monitor and assess patient's chronic conditions and comorbid symptoms for stability, deterioration, or improvement     Care plan reviewed with patient and FOMURIEL Ivan. Patient and FOMURIEL Ivan verbalize understanding of the plan of care and contribute to goal setting.

## 2022-10-18 NOTE — FLOWSHEET NOTE
Admitted to 5A 17 in wheelchair , infant at [facility], oriented to room and unit, voices understanding.

## 2022-10-18 NOTE — PROGRESS NOTES
Department of Obstetrics and Gynecology  Labor and Delivery  Attending Post Partum Progress Note    PPD #1    SUBJECTIVE: Feeling well. No complaints. Bleeding wnl. Tolerating regular diet. Currently on magnesium for seizure prophylaxis. Raymundo in place with good urine output. Not yet ambulating. Pt's baby had to be transferred to [facility] overnight per protocol due to cord gases. She remains in good spirits and is grateful he is at a higher level of care if he were to need anything extra. OBJECTIVE:     Vitals:  /71   Pulse (!) 105   Temp 97 °F (36.1 °C) (Temporal)   Resp 16   Ht 5' 7\" (1.702 m)   Wt (!) 326 lb (147.9 kg)   SpO2 97%   Breastfeeding Unknown   BMI 51.06 kg/m²     Uterus:  normal size, well involuted, firm, non-tender    DATA:    Hemoglobin:   Lab Results   Component Value Date/Time    HGB 12.9 10/17/2022 06:05 AM       ASSESSMENT & PLAN:    Doing well postpartum. Will plan to discontinue magnesium this evening and continue to monitor BPs. BPs have been wnl overnight aside from one mild range. Continue to check blood sugars fasting, 2hrs postprandial and at bedtime. Insulin regimen cut in half post-delivery, so now getting NPH 20u qHS and 10u regular insulin with meals.      Guerda Craig, DO 10/18/2022

## 2022-10-19 VITALS
HEIGHT: 67 IN | HEART RATE: 76 BPM | BODY MASS INDEX: 45.99 KG/M2 | DIASTOLIC BLOOD PRESSURE: 91 MMHG | OXYGEN SATURATION: 97 % | SYSTOLIC BLOOD PRESSURE: 139 MMHG | TEMPERATURE: 98.3 F | WEIGHT: 293 LBS | RESPIRATION RATE: 16 BRPM

## 2022-10-19 LAB — GLUCOSE BLD-MCNC: 107 MG/DL (ref 70–108)

## 2022-10-19 PROCEDURE — 6370000000 HC RX 637 (ALT 250 FOR IP): Performed by: STUDENT IN AN ORGANIZED HEALTH CARE EDUCATION/TRAINING PROGRAM

## 2022-10-19 PROCEDURE — 82948 REAGENT STRIP/BLOOD GLUCOSE: CPT

## 2022-10-19 PROCEDURE — 2580000003 HC RX 258: Performed by: STUDENT IN AN ORGANIZED HEALTH CARE EDUCATION/TRAINING PROGRAM

## 2022-10-19 RX ADMIN — ACETAMINOPHEN 1000 MG: 500 TABLET ORAL at 04:23

## 2022-10-19 RX ADMIN — SODIUM CHLORIDE, PRESERVATIVE FREE 10 ML: 5 INJECTION INTRAVENOUS at 08:08

## 2022-10-19 RX ADMIN — DOCUSATE SODIUM 100 MG: 100 CAPSULE, LIQUID FILLED ORAL at 07:35

## 2022-10-19 RX ADMIN — IBUPROFEN 800 MG: 800 TABLET, FILM COATED ORAL at 04:23

## 2022-10-19 RX ADMIN — INSULIN LISPRO 10 UNITS: 100 INJECTION, SOLUTION INTRAVENOUS; SUBCUTANEOUS at 08:06

## 2022-10-19 ASSESSMENT — PAIN SCALES - GENERAL
PAINLEVEL_OUTOF10: 0
PAINLEVEL_OUTOF10: 2
PAINLEVEL_OUTOF10: 0

## 2022-10-19 ASSESSMENT — PAIN DESCRIPTION - DESCRIPTORS: DESCRIPTORS: ACHING

## 2022-10-19 ASSESSMENT — PAIN DESCRIPTION - PAIN TYPE: TYPE: ACUTE PAIN

## 2022-10-19 ASSESSMENT — PAIN - FUNCTIONAL ASSESSMENT: PAIN_FUNCTIONAL_ASSESSMENT: ACTIVITIES ARE NOT PREVENTED

## 2022-10-19 ASSESSMENT — PAIN DESCRIPTION - FREQUENCY: FREQUENCY: INTERMITTENT

## 2022-10-19 ASSESSMENT — PAIN DESCRIPTION - LOCATION: LOCATION: GENERALIZED

## 2022-10-19 ASSESSMENT — PAIN DESCRIPTION - ONSET: ONSET: ON-GOING

## 2022-10-19 NOTE — DISCHARGE SUMMARY
Vaginal Delivery Discharge Summary    Gestational Age:37w2d    Antepartum complications: Type II DM, pre-e without severe features    Admission date: 10/17/2022  5:49 AM      Type of Delivery:      [place] Data  Information for the patient's :  Deion Vera [[id number]]   male   Birth Weight: 9 lb 0.6 oz (4.1 kg)     Labs: CBC   Lab Results   Component Value Date    HGB 12.9 10/17/2022    HCT 39.7 10/17/2022        Intrapartum complications: Shoulder Dystocia--discussed C/S with future pregnancies    Postpartum complications: none    The patient is ambulating well. The patient is tolerating a normal diet. Patient Instructions: Activity: no sex for 6 weeks  Diet: regular  Wound Care: ice to area for comfort    Discharge Information  Current Discharge Medication List        START taking these medications    Details   insulin NPH (HUMULIN N) 100 UNIT/ML injection vial Inject 20 Units into the skin nightly  Qty: 10 mL, Refills: 1           CONTINUE these medications which have CHANGED    Details   insulin regular (HUMULIN R;NOVOLIN R) 100 UNIT/ML injection Inject 10 Units into the skin 3 times daily With meals  Qty: 10 mL, Refills: 1           CONTINUE these medications which have NOT CHANGED    Details   metoclopramide (REGLAN) 10 MG tablet Take 10 mg by mouth every 6 hours as needed      diphenhydrAMINE (BENADRYL) 25 MG capsule Take 25 mg by mouth in the morning, at noon, and at bedtime      acetaminophen (TYLENOL) 500 MG tablet Take 1,000 mg by mouth every 6 hours as needed for Pain      Ferrous Sulfate (IRON PO) Take 1 tablet by mouth daily           STOP taking these medications       insulin lispro (HUMALOG) 100 UNIT/ML SOLN injection vial Comments:   Reason for Stopping:         NIFEdipine (PROCARDIA XL) 30 MG extended release tablet Comments:   Reason for Stopping:               No discharge procedures on file.     Condition: Good    Plan:   Follow up in 1 week(s) for BP check    Electronically signed by Gwen Fish DO on 10/19/2022 at 8:50 AM

## 2022-10-19 NOTE — LACTATION NOTE
Pt. Continues to pump for infant who was transferred to [address] [place] [address] Drive she has no questions or concerns at this time. Encouraged pt. To call lactation with any questions or concerns.

## 2022-10-19 NOTE — PLAN OF CARE
Problem: Pain  Goal: Verbalizes/displays adequate comfort level or baseline comfort level  10/18/2022 2114 by Opal Brooke RN  Outcome: Progressing  Flowsheets (Taken 10/18/2022 1956)  Verbalizes/displays adequate comfort level or baseline comfort level:   Encourage patient to monitor pain and request assistance   Assess pain using appropriate pain scale     Problem: Infection - Adult  Goal: Absence of infection during hospitalization  10/18/2022 2114 by Opal Brooke RN  Outcome: Progressing  Flowsheets (Taken 10/18/2022 1956)  Absence of infection during hospitalization:   Monitor lab/diagnostic results   Assess and monitor for signs and symptoms of infection     Problem: Discharge Planning  Goal: Discharge to home or other facility with appropriate resources  10/18/2022 2114 by Opal Brooke RN  Outcome: Progressing  Flowsheets (Taken 10/18/2022 1956)  Discharge to home or other facility with appropriate resources: Identify barriers to discharge with patient and caregiver     Problem: Chronic Conditions and Co-morbidities  Goal: Patient's chronic conditions and co-morbidity symptoms are monitored and maintained or improved  10/18/2022 2114 by Opal Brooke RN  Outcome: Sangeeta Moralez (Taken 10/18/2022 0735 by Melina Ross RN)  Care Plan - Patient's Chronic Conditions and Co-Morbidity Symptoms are Monitored and Maintained or Improved: Monitor and assess patient's chronic conditions and comorbid symptoms for stability, deterioration, or improvement    Care plan reviewed with patient and she contributes to goal setting and voices understanding of plan of care.

## 2022-10-19 NOTE — DISCHARGE INSTRUCTIONS
DISCHARGE INSTRUCTIONS FOR  PATIENTS AND FAMILY      Discharge Instructions for Labor and Delivery, Vaginal Birth     A vaginal birth refers to the baby being delivered through the vagina. The amount of time that labor can take varies greatly. Labor for the average first-born baby is about 16 hours. Usually your hospital stay after a routine delivery is no more than two nights. Some new mothers go home the same day. Recovery from childbirth varies depending upon whether you had an episiotomy (an incision in the perineum, the area between your vaginal opening and your anus), the duration of labor and delivery, and the amount of rest you get. In general, it takes about 6-8 weeks for a woman's body to recover from childbirth. What You Will Need   Along with your medications, you will need the following:   Sanitary pads    Nursing pads    Witch hazel pads    Possibly a [employer] [address] will want to arrange for transportation home for you and your baby. The baby will need a car seat. You will receive instructions in the hospital for breastfeeding and taking care of the perineum area. You may use ointment for cracked nipples or warm water rinses to your perineum. You will need to wear sanitary pads for about six weeks after delivery. If you had an episiotomy or vaginal tear, you will be sent home with a plastic squirt bottle. Fill it with warm water and squirt over the vaginal and anal area every time you urinate and defecate. Warm baths can be soothing to healing tissues. Apply warm or cold cloths to sore breasts. Apply ointment to cracked nipples. Use nursing pads for leaky breast.    Apply witch hazel pads to sore perineum (area between vagina and anus). Ask your doctor about when it is safe for you to shower or bathe. Sit in a sitz bath to soothe sore perineum and/or hemorrhoids. A sitz bath is soaking the hip and buttocks area in warm water.  You can buy a plastic sitz bath at most drugstores. It will fit on your toilet. You can also use your bathtub. Diet    Eat a well balanced, healthy diet to help your recover from childbirth. If you are breastfeeding, you will need additional calories each day. You may also be advised to avoid certain foods. Some women experience constipation after childbirth. To avoid this problem:   Drink plenty of fluids. Eat foods high in fiber, such as:   Whole grain cereals and breads   Fruits and vegetables   Legumes (eg, beans, lentils)       Physical Activity    Labor and delivery is tiring. Rest when you can to regain your energy. Your doctor will encourage you to exercise by walking. Ask your doctor when you will be able to return to more strenuous exercise. Your doctor may suggest you do Kegel exercises to strengthen your pelvic muscles. To do these tighten your muscles as if you were stopping your urine flow. Hold for a few seconds and then relax. Do these throughout the day. Medications    Breastfeeding can cause your uterus to contract. If painful, your doctor may recommend a pain reliever. If you are breastfeeding, it's important to ask your doctor about taking medications. You may receive from the hospital a list of medications to avoid. Once your doctor has approved your medications, it's important to: Take your medication as directed. Do not change the amount or the schedule. Do not stop taking them without talking to your doctor. Do not share them. Know what the results and side effects may be. Report bothersome side effects to your doctor. Some drugs can be dangerous when mixed. Talk to a doctor or pharmacist if you are taking more than one drug. This includes over-the-counter medication and herb or dietary supplements. Plan ahead for refills so you don't run out.    Ibuprofen over the counter works well for uterine contractions        Lifestyle Changes    Having a baby requires significant lifestyle changes. You and your doctor will plan lifestyle changes that will help you recover. Some things to keep in mind include: It is important to get enough rest so you can recover. Try sleeping when the baby sleeps. Ask your doctor when you can resume sexual relations. If you have not done so already, talk to your doctor about birth control options. If you are breastfeeding, consider a breast pump. Call your obstetrician and/or pediatrician for any questions that arise. Understand the changes your body is going through as it recovers from childbirth:   Hot and cold flashes as your body adjusts to new hormone and blood flow levels   Urinary or fecal incontinence due to stretched muscles   After pains from uterine contractions as the uterus shrinks   Vaginal bleeding (called lochia), which is heavier than your period (generally stops within two months)   Baby blues, feelings of irritability, sadness, crying, or anxiety. Postpartum depression is more severe, occurring in 10%-20% of new moms. If you experience such symptoms, contact your doctor. Consider joining a support group for new mothers. You can get encouragement and learn parenting strategies. Follow-up   Schedule a follow-up appointment as directed by your doctor. Call Your Doctor If Any of the Following Occurs   It is important for you to monitor your recovery once you leave the hospital. That way, you can alert your doctor to any problems immediately.  If any of the following occurs, call your doctor:   Signs of infection, including fever and chills    Increased bleeding: soaking more than one sanitary pad an hour    Wounds that become red, swollen or drain pus    Vaginal discharge that smells foul    New pain, swelling, or tenderness in your legs    Pain that you can't control with the medications you've been given    Pain, burning, urgency or frequency of urination, or persistent bleeding in the urine    Cough, shortness of breath, or chest pain    Depression, suicidal thoughts, or feelings of harming your baby    Breasts that are hot, red and accompanied by fever    Any cracking or bleeding from the nipple or areola (the dark-colored area of the breast)      In case of an emergency, call 911 immediately.      Stephan Epps DO 10/19/2022 8:53 AM

## 2022-10-19 NOTE — PROGRESS NOTES
Department of Obstetrics and Gynecology  Labor and Delivery  Attending Post Partum Progress Note    PPD #2    SUBJECTIVE: Feeling well. No complaints. Bleeding wnl. Voiding spontaneously. Tolerating regular diet. Ambulating without lightheadedness. BPs mostly wnl this admission without meds. Blood sugars have been pretty well controlled with current insulin regimen. OBJECTIVE:     Vitals:  BP (!) 139/91   Pulse 76   Temp 98.3 °F (36.8 °C) (Oral)   Resp 16   Ht 5' 7\" (1.702 m)   Wt (!) 326 lb (147.9 kg)   SpO2 97%   Breastfeeding Unknown   BMI 51.06 kg/m²     Uterus:  normal size, well involuted, firm, non-tender    DATA:    Hemoglobin:   Lab Results   Component Value Date/Time    HGB 12.9 10/17/2022 06:05 AM       ASSESSMENT & PLAN:    Doing well. BPs overall well controlled without meds this admission. Will schedule BP check in ~1wk. Continue NPH 20u qHS and 10u fast acting with meals. Pt instructed to schedule follow up with PCP within the next week for diabetes management. Discharge home today. Discharge instructions discussed with patient.     Darwin Keyes DO 10/19/2022

## 2022-10-19 NOTE — PROGRESS NOTES
Post birth warning signs education paper given and reviewed, teaching complete. [place] postpartum depression screening discussed with patient, instructed to contact her healthcare provider if her score is > 10. Patient voiced understanding.   Mother's blood type is A+

## 2022-10-19 NOTE — PLAN OF CARE
Problem: Pain  Goal: Verbalizes/displays adequate comfort level or baseline comfort level  10/19/2022 0802 by Cem Nix RN  Outcome: Completed  Flowsheets (Taken 10/19/2022 0802)  Verbalizes/displays adequate comfort level or baseline comfort level: Assess pain using appropriate pain scale  Note: Pain controlled with po meds. Discussed ice for perineal pain and the use of warm blanket/heating pad for uterine cramps. Pt states her pain goal 5/10 has been met. 10/18/2022 2114 by Karina Hernández RN  Outcome: Progressing  Flowsheets (Taken 10/18/2022 1956)  Verbalizes/displays adequate comfort level or baseline comfort level:   Encourage patient to monitor pain and request assistance   Assess pain using appropriate pain scale  10/18/2022 1839 by Carole Dutta RN  Outcome: Progressing     Problem: Infection - Adult  Goal: Absence of infection during hospitalization  10/19/2022 0802 by Cem Nix RN  Outcome: Completed  Flowsheets (Taken 10/19/2022 0802)  Absence of infection during hospitalization: Monitor lab/diagnostic results  Note: Vital signs and assessments WNL.     10/18/2022 2114 by Karina Hernández RN  Outcome: Progressing  Flowsheets (Taken 10/18/2022 1956)  Absence of infection during hospitalization:   Monitor lab/diagnostic results   Assess and monitor for signs and symptoms of infection  10/18/2022 1839 by Carole Dutta RN  Outcome: Progressing     Problem: Safety - Adult  Goal: Free from fall injury  10/19/2022 0802 by Cem Nix RN  Outcome: Completed  Flowsheets (Taken 10/19/2022 0802)  Free From Fall Injury: Instruct family/caregiver on patient safety  Note: No falls    10/18/2022 2114 by Karina Hernández RN  Outcome: Progressing  Flowsheets (Taken 10/18/2022 1956)  Free From Fall Injury: Instruct family/caregiver on patient safety  10/18/2022 1839 by Carole Dutta RN  Outcome: Progressing     Problem: Discharge Planning  Goal: Discharge to home or other facility with appropriate resources  10/19/2022 0802 by Patricia Leal RN  Outcome: Completed  Flowsheets (Taken 10/19/2022 0802)  Discharge to home or other facility with appropriate resources: Identify barriers to discharge with patient and caregiver  Note: Plans to be discharged home with family when appropriate    10/18/2022 2114 by Ada Wynne RN  Outcome: Progressing  Flowsheets (Taken 10/18/2022 1956)  Discharge to home or other facility with appropriate resources: Identify barriers to discharge with patient and caregiver  10/18/2022 1839 by Yamel Rowe RN  Outcome: Progressing     Problem: Chronic Conditions and Co-morbidities  Goal: Patient's chronic conditions and co-morbidity symptoms are monitored and maintained or improved  10/19/2022 0802 by Patricia Leal RN  Outcome: Completed  Flowsheets (Taken 10/19/2022 0802)  Care Plan - Patient's Chronic Conditions and Co-Morbidity Symptoms are Monitored and Maintained or Improved: Monitor and assess patient's chronic conditions and comorbid symptoms for stability, deterioration, or improvement  Note: GHTN and GDM, vitals and assessment WDL  10/18/2022 2114 by Ada Wynne RN  Outcome: Progressing  Flowsheets (Taken 10/18/2022 0735 by Shamika Harper RN)  Care Plan - Patient's Chronic Conditions and Co-Morbidity Symptoms are Monitored and Maintained or Improved: Monitor and assess patient's chronic conditions and comorbid symptoms for stability, deterioration, or improvement  10/18/2022 1839 by Yamel Rowe RN  Outcome: Progressing   Care plan reviewed with patient and she verbalize understanding of the plan of care and contribute to goal setting.

## 2022-10-19 NOTE — PROGRESS NOTES
Discharge teaching and instructions for diagnosis/procedure of  completed with patient using teachback method. AVS reviewed. Patient voiced understanding regarding prescriptions, follow up appointments, and care of self at home.  Discharged in a wheelchair to  home with support per family

## 2022-10-25 NOTE — PROGRESS NOTES
[address] [place], [place] [id number]                                NON STRESS TEST    PATIENT NAME: Dimitrios Renee                     :        2000  MED REC NO:   [id number]                           ROOM:       0006  ACCOUNT NO:   [de-identified]                           ADMIT DATE: 10/03/2022  PROVIDER:     Breana Stephenson. Nate Tenorio M.D.      [facility]  [facility]    INDICATIONS:  The patient is a 27-year-old G1, P0, at 35-2/7 weeks'  gestation who presented with gestational hypertension. While being  evaluated, she had a reactive nonstress test with a baseline of 130 and  accelerations and moderate variability.         Lola Bolivar M.D.    D: 10/23/2022 20:24:37       T: 10/24/2022 2:09:07     [email or web address]  Job#: [id number]     Doc#: [id number]    CC:

## 2022-10-26 NOTE — PROCEDURES
Department of Obstetrics and Gynecology  Labor and Delivery   Triage Note      Pt Name: Lolis Espino  MRN: [id number] Kimdesiree #: [de-identified]  YOB: 2000  Procedure Performed By: Fabricio Dsouza DO        SUBJECTIVE: Patient presented at 36 week complaining of headache and elevated BP. OBJECTIVE    Vitals:  BP (!) 140/70   Pulse 84   Temp 97.3 °F (36.3 °C) (Temporal)   Resp 16   Ht 5' 7\" (1.702 m)   Wt (!) 327 lb (148.3 kg)   SpO2 96%   BMI 51.22 kg/m²     Fetal heart rate:         Baseline Heart Rate:  150        Accelerations:  present       Decelerations:  absent       Variability:  moderate    Contraction frequency: None    The NST was reactive. It was a Category 1 tracing. ASSESSMENT & PLAN:    Headache resolved. Discharged to home in a stable condition and instructed to follow up at her next scheduled appointment.       Fabricio Dsouza DO

## 2022-11-29 ENCOUNTER — NURSE ONLY (OUTPATIENT)
Dept: LAB | Age: 22
End: 2022-11-29

## 2023-02-08 ENCOUNTER — HOSPITAL ENCOUNTER (EMERGENCY)
Age: 23
Discharge: HOME | End: 2023-02-08
Payer: COMMERCIAL

## 2023-02-08 VITALS
SYSTOLIC BLOOD PRESSURE: 130 MMHG | WEIGHT: 293 LBS | HEART RATE: 121 BPM | RESPIRATION RATE: 16 BRPM | OXYGEN SATURATION: 96 % | DIASTOLIC BLOOD PRESSURE: 82 MMHG | TEMPERATURE: 96.5 F | BODY MASS INDEX: 49.34 KG/M2

## 2023-02-08 DIAGNOSIS — J06.9 ACUTE UPPER RESPIRATORY INFECTION: Primary | ICD-10-CM

## 2023-02-08 LAB
FLUAV RNA RESP QL NAA+PROBE: NOT DETECTED
FLUBV RNA RESP QL NAA+PROBE: NOT DETECTED
S PYO AG THROAT QL: NEGATIVE
SARS-COV-2 RNA RESP QL NAA+PROBE: DETECTED

## 2023-02-08 PROCEDURE — 87636 SARSCOV2 & INF A&B AMP PRB: CPT

## 2023-02-08 PROCEDURE — 99213 OFFICE O/P EST LOW 20 MIN: CPT

## 2023-02-08 PROCEDURE — 87651 STREP A DNA AMP PROBE: CPT

## 2023-02-08 RX ORDER — ESCITALOPRAM OXALATE 10 MG/1
TABLET ORAL
COMMUNITY
Start: 2023-01-30

## 2023-02-08 RX ORDER — LORATADINE, PSEUDOEPHEDRINE SULFATE 5; 120 MG/1; MG/1
1 TABLET, FILM COATED, EXTENDED RELEASE ORAL 2 TIMES DAILY
Qty: 20 TABLET | Refills: 0 | Status: SHIPPED | OUTPATIENT
Start: 2023-02-08 | End: 2023-02-18

## 2023-02-08 RX ORDER — PREDNISONE 20 MG/1
40 TABLET ORAL DAILY
Qty: 10 TABLET | Refills: 0 | Status: SHIPPED | OUTPATIENT
Start: 2023-02-08 | End: 2023-02-13

## 2023-02-08 ASSESSMENT — ENCOUNTER SYMPTOMS
DIARRHEA: 0
VOMITING: 0
SINUS PAIN: 0
SHORTNESS OF BREATH: 0
ABDOMINAL PAIN: 0
SINUS PRESSURE: 0
WHEEZING: 0
EYES NEGATIVE: 1
RHINORRHEA: 0
SORE THROAT: 1
NAUSEA: 0
CONSTIPATION: 0

## 2023-02-08 ASSESSMENT — PAIN - FUNCTIONAL ASSESSMENT: PAIN_FUNCTIONAL_ASSESSMENT: 0-10

## 2023-02-08 ASSESSMENT — PAIN SCALES - GENERAL: PAINLEVEL_OUTOF10: 7

## 2023-02-08 ASSESSMENT — PAIN DESCRIPTION - LOCATION: LOCATION: THROAT

## 2023-02-08 NOTE — ED PROVIDER NOTES
[facility] Encounter      [facility]       Chief Complaint   Patient presents with    Pharyngitis       Nurses Notes reviewed and I agree except as noted in the HPI. HISTORY OF PRESENT ILLNESS   Floyd Cunningham is a 25 y.o. female who presents to urgent care today complaining of a sore throat. She has had a tonsillectomy. Onset was just this morning. She reports that she has had some chills at night. She is otherwise generally healthy. She is no longer breast-feeding. REVIEW OF SYSTEMS     Review of Systems   Constitutional:  Negative for chills, fatigue, fever and unexpected weight change. HENT:  Positive for congestion and sore throat. Negative for postnasal drip, rhinorrhea, sinus pressure, sinus pain and sneezing. Eyes: Negative. Respiratory:  Negative for shortness of breath and wheezing. Cardiovascular:  Negative for chest pain. Gastrointestinal:  Negative for abdominal pain, constipation, diarrhea, nausea and vomiting. Endocrine: Negative. Genitourinary:  Negative for difficulty urinating, dyspareunia, dysuria, hematuria, urgency, vaginal bleeding, vaginal discharge and vaginal pain. Musculoskeletal:  Negative for myalgias. Skin:  Negative for rash. Neurological:  Negative for dizziness, light-headedness and headaches. Hematological:  Negative for adenopathy. Psychiatric/Behavioral:  Negative for agitation. PAST MEDICAL HISTORY         Diagnosis Date    Anemia     takes extra iron    Depression     Gestational hypertension     on meds    Insulin controlled gestational diabetes mellitus (GDM) during pregnancy        SURGICAL HISTORY     Patient  has a past surgical history that includes Tonsillectomy.     CURRENT MEDICATIONS       Discharge Medication List as of 2/8/2023  9:44 AM        CONTINUE these medications which have NOT CHANGED    Details   escitalopram (LEXAPRO) 10 MG tablet take 1 tablet by mouth once dailyHistorical Med acetaminophen (TYLENOL) 500 MG tablet Take 1,000 mg by mouth every 6 hours as needed for PainHistorical Med             ALLERGIES     Patient is has No Known Allergies. FAMILY HISTORY     Patient'sfamily history includes Asthma in her mother; Diabetes in her mother. SOCIAL HISTORY     Patient  reports that she has never smoked. She has never used smokeless tobacco. She reports that she does not drink alcohol and does not use drugs. PHYSICAL EXAM     ED TRIAGE VITALS  BP: 130/82, Temp: (!) 96.5 °F (35.8 °C), Heart Rate: (!) 121, Resp: 16, SpO2: 96 %  Physical Exam  Vitals and nursing note reviewed. Constitutional:       General: She is not in acute distress. Appearance: Normal appearance. She is not ill-appearing or toxic-appearing. HENT:      Head: Normocephalic and atraumatic. Nose: No congestion or rhinorrhea. Mouth/Throat:      Mouth: Mucous membranes are moist.      Pharynx: Oropharynx is clear. Eyes:      Extraocular Movements: Extraocular movements intact. Conjunctiva/sclera: Conjunctivae normal.      Pupils: Pupils are equal, round, and reactive to light. Cardiovascular:      Rate and Rhythm: Normal rate and regular rhythm. Pulses: Normal pulses. Heart sounds: Normal heart sounds. No murmur heard. Pulmonary:      Effort: Pulmonary effort is normal. No respiratory distress. Breath sounds: Normal breath sounds. No wheezing. Abdominal:      General: Abdomen is flat. Palpations: Abdomen is soft. Tenderness: There is no abdominal tenderness. Musculoskeletal:         General: No swelling or deformity. Normal range of motion. Cervical back: Normal range of motion and neck supple. Skin:     General: Skin is warm and dry. Capillary Refill: Capillary refill takes less than 2 seconds. Findings: No rash. Neurological:      General: No focal deficit present. Mental Status: She is alert and oriented to person, place, and time. Mental status is at baseline. Psychiatric:         Mood and Affect: Mood normal.         Behavior: Behavior normal.         Thought Content: Thought content normal.         Judgment: Judgment normal.       DIAGNOSTIC RESULTS   Labs:  Results for orders placed or performed during the hospital encounter of 02/08/23   Strep Screen Group A Throat   Result Value Ref Range    Rapid Strep A Screen NEGATIVE        IMAGING:  No orders to display     URGENT CARE COURSE:         Medications - No data to display  PROCEDURES:  FINALIMPRESSION      1. Acute upper respiratory infection        DISPOSITION/PLAN   DISPOSITION Decision To Discharge 02/08/2023 09:43:41 AM  Strep is negative. Will send PCR COVID and flu. Will start on loratadine D and prednisone for sinus congestion and sore throat. Advised oral rehydration. Follow-up with primary care provider as needed. She denies any questions. Report to ER with new or severe symptoms. PATIENT REFERRED TO:  No follow-up provider specified.   DISCHARGE MEDICATIONS:  Discharge Medication List as of 2/8/2023  9:44 AM        START taking these medications    Details   loratadine-pseudoephedrine (LORATADINE-D 12HR) 5-120 MG per extended release tablet Take 1 tablet by mouth 2 times daily for 10 days, Disp-20 tablet, R-0Normal      predniSONE (DELTASONE) 20 MG tablet Take 2 tablets by mouth daily for 5 days, Disp-10 tablet, R-0Normal           Discharge Medication List as of 2/8/2023  9:44 AM          MARIA LUZ Nolasco CNP, APRN - CNP  02/08/23 [id number]

## 2023-02-08 NOTE — PROGRESS NOTES
Nasopharyngeal COVID swab obtained. Patient tolerated well. Discharge instructions reviewed. Patient verbalized understanding.

## 2023-02-08 NOTE — ED TRIAGE NOTES
Kat Obrien arrives to room with complaint of  sore throat, sinus congestion, headache  symptoms started 1 days ago.        Work note

## 2023-02-08 NOTE — LETTER
[facility]  [address] [place] [id number]  Phone: [phone]               February 8, 2023    Patient: Eyal Reardon   YOB: 2000   Date of Visit: 2/8/2023       To Whom It May Concern:    Abigail Romeo was seen and treated in our emergency department on 2/8/2023. She may return to work on 02/10/23.       Sincerely,       Anne Marie Pickard RN, BSN         Signature:__________________________________

## 2023-03-16 ENCOUNTER — HOSPITAL ENCOUNTER (EMERGENCY)
Age: 23
Discharge: HOME | End: 2023-03-16
Payer: COMMERCIAL

## 2023-03-16 VITALS
OXYGEN SATURATION: 97 % | HEART RATE: 102 BPM | DIASTOLIC BLOOD PRESSURE: 78 MMHG | TEMPERATURE: 99.1 F | SYSTOLIC BLOOD PRESSURE: 126 MMHG | RESPIRATION RATE: 18 BRPM

## 2023-03-16 DIAGNOSIS — J02.9 VIRAL PHARYNGITIS: Primary | ICD-10-CM

## 2023-03-16 LAB — S PYO AG THROAT QL: NEGATIVE

## 2023-03-16 PROCEDURE — 87651 STREP A DNA AMP PROBE: CPT

## 2023-03-16 PROCEDURE — 99212 OFFICE O/P EST SF 10 MIN: CPT | Performed by: EMERGENCY MEDICINE

## 2023-03-16 PROCEDURE — 99213 OFFICE O/P EST LOW 20 MIN: CPT

## 2023-03-16 ASSESSMENT — PAIN DESCRIPTION - PAIN TYPE: TYPE: ACUTE PAIN

## 2023-03-16 ASSESSMENT — ENCOUNTER SYMPTOMS
NAUSEA: 0
SHORTNESS OF BREATH: 0
RHINORRHEA: 0
SINUS PRESSURE: 0
VOMITING: 0
ABDOMINAL PAIN: 0
SORE THROAT: 1
DIARRHEA: 0
SINUS PAIN: 0
COUGH: 1

## 2023-03-16 ASSESSMENT — PAIN - FUNCTIONAL ASSESSMENT: PAIN_FUNCTIONAL_ASSESSMENT: 0-10

## 2023-03-16 ASSESSMENT — PAIN DESCRIPTION - DESCRIPTORS: DESCRIPTORS: SORE

## 2023-03-16 ASSESSMENT — PAIN DESCRIPTION - LOCATION: LOCATION: THROAT

## 2023-03-16 ASSESSMENT — PAIN SCALES - GENERAL: PAINLEVEL_OUTOF10: 6

## 2023-03-16 ASSESSMENT — PAIN DESCRIPTION - FREQUENCY: FREQUENCY: CONTINUOUS

## 2023-03-16 NOTE — Clinical Note
Celina Jc was seen and treated in our emergency department on 3/16/2023. She may return to work on 03/17/2023. If you have any questions or concerns, please don't hesitate to call.       Glen Moe, MARIA LUZ - CNP

## 2023-03-16 NOTE — ED PROVIDER NOTES
[facility]  Urgent Care Encounter       CHIEF COMPLAINT       Chief Complaint   Patient presents with    Pharyngitis     Onset Tuesday - strep exposure       Nurses Notes reviewed and I agree except as noted in the HPI. HISTORY OF PRESENT ILLNESS   Brian Patterson is a 25 y.o. female who presents for complaints of sore throat, fatigue, generalized body aches and occasional cough. Patient states that she had a family member stay with her over the weekend who tested positive for strep the day after she stayed with her. No known fevers. No difficulty swallowing. HPI    REVIEW OF SYSTEMS     Review of Systems   Constitutional:  Positive for fatigue. Negative for activity change and fever. HENT:  Positive for sore throat. Negative for rhinorrhea, sinus pressure and sinus pain. Respiratory:  Positive for cough. Negative for shortness of breath. Gastrointestinal:  Negative for abdominal pain, diarrhea, nausea and vomiting. Neurological:  Negative for dizziness and headaches. PAST MEDICAL HISTORY         Diagnosis Date    Anemia     takes extra iron    Depression     Gestational hypertension     on meds    Insulin controlled gestational diabetes mellitus (GDM) during pregnancy        SURGICALHISTORY     Patient  has a past surgical history that includes Tonsillectomy. CURRENT MEDICATIONS       Discharge Medication List as of 3/16/2023  9:58 AM        CONTINUE these medications which have NOT CHANGED    Details   escitalopram (LEXAPRO) 10 MG tablet take 1 tablet by mouth once dailyHistorical Med      acetaminophen (TYLENOL) 500 MG tablet Take 1,000 mg by mouth every 6 hours as needed for PainHistorical Med             ALLERGIES     Patient is has No Known Allergies. Patients There is no immunization history for the selected administration types on file for this patient.     FAMILY HISTORY     Patient's family history includes Asthma in her mother; Diabetes in her mother. SOCIAL HISTORY     Patient  reports that she has never smoked. She has never used smokeless tobacco. She reports that she does not drink alcohol and does not use drugs. PHYSICAL EXAM     ED TRIAGE VITALS  BP: 126/78, Temp: 99.1 °F (37.3 °C), Heart Rate: (!) 102, Resp: 18, SpO2: 97 %,Estimated body mass index is 49.34 kg/m² as calculated from the following:    Height as of 10/17/22: 5' 7\" (1.702 m). Weight as of 2/8/23: 315 lb (142.9 kg). ,Patient's last menstrual period was 03/16/2023. Physical Exam  Constitutional:       Appearance: She is not ill-appearing. HENT:      Head: Normocephalic. Right Ear: Tympanic membrane and ear canal normal.      Left Ear: Tympanic membrane and ear canal normal.      Nose: No congestion or rhinorrhea. Mouth/Throat:      Mouth: Mucous membranes are moist.      Pharynx: Oropharynx is clear. No posterior oropharyngeal erythema. Tonsils: No tonsillar exudate. 0 on the right. 0 on the left. Cardiovascular:      Rate and Rhythm: Normal rate. Heart sounds: Normal heart sounds. Pulmonary:      Effort: Pulmonary effort is normal.      Breath sounds: Normal breath sounds. Lymphadenopathy:      Cervical: No cervical adenopathy. Skin:     General: Skin is warm and dry. Neurological:      Mental Status: She is alert. DIAGNOSTIC RESULTS     Labs:  Results for orders placed or performed during the hospital encounter of 03/16/23   Strep Screen Group A Throat   Result Value Ref Range    Rapid Strep A Screen NEGATIVE        IMAGING:    No orders to display         EKG:      URGENT CARE COURSE:     Vitals:    03/16/23 0933   BP: 126/78   Pulse: (!) 102   Resp: 18   Temp: 99.1 °F (37.3 °C)   TempSrc: Temporal   SpO2: 97%       Medications - No data to display         PROCEDURES:  None    FINAL IMPRESSION      1. Viral pharyngitis          DISPOSITION/ PLAN     Presents for what is likely a viral pharyngitis. Rapid strep negative.   Patient had COVID less than 2 months ago.  Advised to use Chloraseptic spray or lozenges, salt water gargles, Tylenol as needed for pain.  Drink plenty of fluids.  Follow-up primary care provider return here if no improvement of symptoms in 5 to 7 days.  Sooner if worse.      PATIENT REFERRED TO:  MARIA LUZ GONZALEZ CNP  [address] [facility] [address] / [facility] [address]      DISCHARGE MEDICATIONS:  Discharge Medication List as of 3/16/2023  9:58 AM          Discharge Medication List as of 3/16/2023  9:58 AM          Discharge Medication List as of 3/16/2023  9:58 AM          MARIA LUZ Beth CNP    (Please note that portions of this note were completed with a voice recognition program. Efforts were made to edit the dictations but occasionally words are mis-transcribed.)           MARIA LUZ Beth CNP  03/16/23 1017

## 2023-03-16 NOTE — DISCHARGE INSTRUCTIONS
Salt water gargles, Chloraseptic spray or lozenges as needed    Tylenol as needed    Follow-up primary care provider return here for new or worsening symptoms.

## 2023-11-22 ENCOUNTER — ANESTHESIA (OUTPATIENT)
Dept: LABOR AND DELIVERY | Age: 23
End: 2023-11-22
Payer: COMMERCIAL

## 2023-11-22 ENCOUNTER — HOSPITAL ENCOUNTER (INPATIENT)
Age: 23
LOS: 3 days | Discharge: HOME | DRG: 540 | End: 2023-11-25
Attending: STUDENT IN AN ORGANIZED HEALTH CARE EDUCATION/TRAINING PROGRAM | Admitting: STUDENT IN AN ORGANIZED HEALTH CARE EDUCATION/TRAINING PROGRAM
Payer: COMMERCIAL

## 2023-11-22 ENCOUNTER — ANESTHESIA EVENT (OUTPATIENT)
Dept: LABOR AND DELIVERY | Age: 23
End: 2023-11-22
Payer: COMMERCIAL

## 2023-11-22 LAB
ABO: NORMAL
AMNISURE PATIENT RESULT: NORMAL
AMPHETAMINES UR QL SCN: NEGATIVE
ANTIBODY SCREEN: NORMAL
BARBITURATES UR QL SCN: NEGATIVE
BENZODIAZ UR QL SCN: NEGATIVE
BZE UR QL SCN: NEGATIVE
CANNABINOIDS UR QL SCN: NEGATIVE
DEPRECATED RDW RBC AUTO: 44.9 FL (ref 35–45)
ERYTHROCYTE [DISTWIDTH] IN BLOOD BY AUTOMATED COUNT: 15 % (ref 11.5–14.5)
FENTANYL: NEGATIVE
GLUCOSE BLD STRIP.AUTO-MCNC: 106 MG/DL (ref 70–108)
GLUCOSE BLD STRIP.AUTO-MCNC: 116 MG/DL (ref 70–108)
HCT VFR BLD AUTO: 36.9 % (ref 37–47)
HGB BLD-MCNC: 11.6 GM/DL (ref 12–16)
MCH RBC QN AUTO: 25.9 PG (ref 26–33)
MCHC RBC AUTO-ENTMCNC: 31.4 GM/DL (ref 32.2–35.5)
MCV RBC AUTO: 82.4 FL (ref 81–99)
OPIATES UR QL SCN: NEGATIVE
OXYCODONE: NEGATIVE
PCP UR QL SCN: NEGATIVE
PLATELET # BLD AUTO: 169 THOU/MM3 (ref 130–400)
PMV BLD AUTO: ABNORMAL FL (ref 9.4–12.4)
RBC # BLD AUTO: 4.48 MILL/MM3 (ref 4.2–5.4)
RH FACTOR: NORMAL
WBC # BLD AUTO: 6.7 THOU/MM3 (ref 4.8–10.8)

## 2023-11-22 PROCEDURE — 6370000000 HC RX 637 (ALT 250 FOR IP): Performed by: ANESTHESIOLOGY

## 2023-11-22 PROCEDURE — 2580000003 HC RX 258: Performed by: STUDENT IN AN ORGANIZED HEALTH CARE EDUCATION/TRAINING PROGRAM

## 2023-11-22 PROCEDURE — 6360000002 HC RX W HCPCS

## 2023-11-22 PROCEDURE — 3700000001 HC ADD 15 MINUTES (ANESTHESIA): Performed by: STUDENT IN AN ORGANIZED HEALTH CARE EDUCATION/TRAINING PROGRAM

## 2023-11-22 PROCEDURE — 6360000002 HC RX W HCPCS: Performed by: NURSE ANESTHETIST, CERTIFIED REGISTERED

## 2023-11-22 PROCEDURE — 2500000003 HC RX 250 WO HCPCS: Performed by: NURSE ANESTHETIST, CERTIFIED REGISTERED

## 2023-11-22 PROCEDURE — 2709999900 HC NON-CHARGEABLE SUPPLY: Performed by: STUDENT IN AN ORGANIZED HEALTH CARE EDUCATION/TRAINING PROGRAM

## 2023-11-22 PROCEDURE — 6370000000 HC RX 637 (ALT 250 FOR IP): Performed by: STUDENT IN AN ORGANIZED HEALTH CARE EDUCATION/TRAINING PROGRAM

## 2023-11-22 PROCEDURE — 3609079900 HC CESAREAN SECTION: Performed by: STUDENT IN AN ORGANIZED HEALTH CARE EDUCATION/TRAINING PROGRAM

## 2023-11-22 PROCEDURE — 82948 REAGENT STRIP/BLOOD GLUCOSE: CPT

## 2023-11-22 PROCEDURE — 7100000001 HC PACU RECOVERY - ADDTL 15 MIN: Performed by: STUDENT IN AN ORGANIZED HEALTH CARE EDUCATION/TRAINING PROGRAM

## 2023-11-22 PROCEDURE — 86901 BLOOD TYPING SEROLOGIC RH(D): CPT

## 2023-11-22 PROCEDURE — 86900 BLOOD TYPING SEROLOGIC ABO: CPT

## 2023-11-22 PROCEDURE — 3700000000 HC ANESTHESIA ATTENDED CARE: Performed by: STUDENT IN AN ORGANIZED HEALTH CARE EDUCATION/TRAINING PROGRAM

## 2023-11-22 PROCEDURE — 80307 DRUG TEST PRSMV CHEM ANLYZR: CPT

## 2023-11-22 PROCEDURE — 6360000002 HC RX W HCPCS: Performed by: ANESTHESIOLOGY

## 2023-11-22 PROCEDURE — 6360000002 HC RX W HCPCS: Performed by: STUDENT IN AN ORGANIZED HEALTH CARE EDUCATION/TRAINING PROGRAM

## 2023-11-22 PROCEDURE — 6370000000 HC RX 637 (ALT 250 FOR IP)

## 2023-11-22 PROCEDURE — 84112 EVAL AMNIOTIC FLUID PROTEIN: CPT

## 2023-11-22 PROCEDURE — A4216 STERILE WATER/SALINE, 10 ML: HCPCS | Performed by: STUDENT IN AN ORGANIZED HEALTH CARE EDUCATION/TRAINING PROGRAM

## 2023-11-22 PROCEDURE — 2580000003 HC RX 258: Performed by: NURSE ANESTHETIST, CERTIFIED REGISTERED

## 2023-11-22 PROCEDURE — 86850 RBC ANTIBODY SCREEN: CPT

## 2023-11-22 PROCEDURE — 2500000003 HC RX 250 WO HCPCS: Performed by: STUDENT IN AN ORGANIZED HEALTH CARE EDUCATION/TRAINING PROGRAM

## 2023-11-22 PROCEDURE — 85027 COMPLETE CBC AUTOMATED: CPT

## 2023-11-22 PROCEDURE — 1220000000 HC SEMI PRIVATE OB R&B

## 2023-11-22 PROCEDURE — 86592 SYPHILIS TEST NON-TREP QUAL: CPT

## 2023-11-22 PROCEDURE — 88307 TISSUE EXAM BY PATHOLOGIST: CPT

## 2023-11-22 PROCEDURE — 2500000003 HC RX 250 WO HCPCS

## 2023-11-22 PROCEDURE — 7100000000 HC PACU RECOVERY - FIRST 15 MIN: Performed by: STUDENT IN AN ORGANIZED HEALTH CARE EDUCATION/TRAINING PROGRAM

## 2023-11-22 RX ORDER — DOCUSATE SODIUM 100 MG/1
100 CAPSULE, LIQUID FILLED ORAL 2 TIMES DAILY
Status: DISCONTINUED | OUTPATIENT
Start: 2023-11-22 | End: 2023-11-25 | Stop reason: HOSPADM

## 2023-11-22 RX ORDER — CITRIC ACID/SODIUM CITRATE 334-500MG
SOLUTION, ORAL ORAL
Status: COMPLETED
Start: 2023-11-22 | End: 2023-11-22

## 2023-11-22 RX ORDER — DIPHENHYDRAMINE HCL 25 MG
25 TABLET ORAL EVERY 6 HOURS PRN
Status: ACTIVE | OUTPATIENT
Start: 2023-11-23 | End: 2023-11-24

## 2023-11-22 RX ORDER — OXYCODONE HYDROCHLORIDE AND ACETAMINOPHEN 5; 325 MG/1; MG/1
1 TABLET ORAL EVERY 6 HOURS PRN
Qty: 28 TABLET | Refills: 0 | Status: SHIPPED | OUTPATIENT
Start: 2023-11-22 | End: 2023-11-29

## 2023-11-22 RX ORDER — EPHEDRINE SULFATE/0.9% NACL/PF 50 MG/5 ML
SYRINGE (ML) INTRAVENOUS PRN
Status: DISCONTINUED | OUTPATIENT
Start: 2023-11-22 | End: 2023-11-22 | Stop reason: SDUPTHER

## 2023-11-22 RX ORDER — SODIUM CHLORIDE 0.9 % (FLUSH) 0.9 %
5-40 SYRINGE (ML) INJECTION PRN
Status: DISCONTINUED | OUTPATIENT
Start: 2023-11-22 | End: 2023-11-25 | Stop reason: HOSPADM

## 2023-11-22 RX ORDER — AZITHROMYCIN 500 MG/1
INJECTION, POWDER, LYOPHILIZED, FOR SOLUTION INTRAVENOUS
Status: COMPLETED
Start: 2023-11-22 | End: 2023-11-22

## 2023-11-22 RX ORDER — KETOROLAC TROMETHAMINE 30 MG/ML
INJECTION, SOLUTION INTRAMUSCULAR; INTRAVENOUS PRN
Status: DISCONTINUED | OUTPATIENT
Start: 2023-11-22 | End: 2023-11-22 | Stop reason: SDUPTHER

## 2023-11-22 RX ORDER — INSULIN GLARGINE 100 [IU]/ML
12 INJECTION, SOLUTION SUBCUTANEOUS NIGHTLY
Status: DISCONTINUED | OUTPATIENT
Start: 2023-11-22 | End: 2023-11-25 | Stop reason: HOSPADM

## 2023-11-22 RX ORDER — IBUPROFEN 800 MG/1
800 TABLET ORAL
Qty: 60 TABLET | Refills: 1 | Status: SHIPPED | OUTPATIENT
Start: 2023-11-22

## 2023-11-22 RX ORDER — OXYCODONE HYDROCHLORIDE 5 MG/1
10 TABLET ORAL EVERY 4 HOURS PRN
Status: DISCONTINUED | OUTPATIENT
Start: 2023-11-23 | End: 2023-11-25 | Stop reason: HOSPADM

## 2023-11-22 RX ORDER — FENTANYL CITRATE 50 UG/ML
INJECTION, SOLUTION INTRAMUSCULAR; INTRAVENOUS
Status: COMPLETED | OUTPATIENT
Start: 2023-11-22 | End: 2023-11-22

## 2023-11-22 RX ORDER — METOCLOPRAMIDE HYDROCHLORIDE 5 MG/ML
INJECTION INTRAMUSCULAR; INTRAVENOUS
Status: COMPLETED
Start: 2023-11-22 | End: 2023-11-22

## 2023-11-22 RX ORDER — ONDANSETRON 2 MG/ML
INJECTION INTRAMUSCULAR; INTRAVENOUS PRN
Status: DISCONTINUED | OUTPATIENT
Start: 2023-11-22 | End: 2023-11-22 | Stop reason: SDUPTHER

## 2023-11-22 RX ORDER — KETOROLAC TROMETHAMINE 30 MG/ML
30 INJECTION, SOLUTION INTRAMUSCULAR; INTRAVENOUS EVERY 6 HOURS
Status: DISCONTINUED | OUTPATIENT
Start: 2023-11-23 | End: 2023-11-24

## 2023-11-22 RX ORDER — MODIFIED LANOLIN
OINTMENT (GRAM) TOPICAL
Status: DISCONTINUED | OUTPATIENT
Start: 2023-11-22 | End: 2023-11-25 | Stop reason: HOSPADM

## 2023-11-22 RX ORDER — OXYCODONE HYDROCHLORIDE 5 MG/1
10 TABLET ORAL EVERY 4 HOURS PRN
Status: DISPENSED | OUTPATIENT
Start: 2023-11-22 | End: 2023-11-23

## 2023-11-22 RX ORDER — CEPHALEXIN 500 MG/1
500 CAPSULE ORAL EVERY 8 HOURS SCHEDULED
Status: DISPENSED | OUTPATIENT
Start: 2023-11-22 | End: 2023-11-24

## 2023-11-22 RX ORDER — SODIUM CHLORIDE, SODIUM LACTATE, POTASSIUM CHLORIDE, CALCIUM CHLORIDE 600; 310; 30; 20 MG/100ML; MG/100ML; MG/100ML; MG/100ML
INJECTION, SOLUTION INTRAVENOUS CONTINUOUS
Status: DISCONTINUED | OUTPATIENT
Start: 2023-11-22 | End: 2023-11-25 | Stop reason: HOSPADM

## 2023-11-22 RX ORDER — OXYCODONE HYDROCHLORIDE 5 MG/1
5 TABLET ORAL EVERY 4 HOURS PRN
Status: DISPENSED | OUTPATIENT
Start: 2023-11-22 | End: 2023-11-23

## 2023-11-22 RX ORDER — SODIUM CHLORIDE 0.9 % (FLUSH) 0.9 %
5-40 SYRINGE (ML) INJECTION EVERY 12 HOURS SCHEDULED
Status: DISCONTINUED | OUTPATIENT
Start: 2023-11-22 | End: 2023-11-24

## 2023-11-22 RX ORDER — ACETAMINOPHEN 500 MG
1000 TABLET ORAL EVERY 8 HOURS SCHEDULED
Status: DISCONTINUED | OUTPATIENT
Start: 2023-11-23 | End: 2023-11-25 | Stop reason: HOSPADM

## 2023-11-22 RX ORDER — INSULIN GLARGINE 100 [IU]/ML
12 INJECTION, SOLUTION SUBCUTANEOUS NIGHTLY
Qty: 30 ADJUSTABLE DOSE PRE-FILLED PEN SYRINGE | Refills: 1 | Status: SHIPPED | OUTPATIENT
Start: 2023-11-22

## 2023-11-22 RX ORDER — SODIUM CHLORIDE 0.9 % (FLUSH) 0.9 %
10 SYRINGE (ML) INJECTION EVERY 12 HOURS SCHEDULED
Status: DISCONTINUED | OUTPATIENT
Start: 2023-11-22 | End: 2023-11-22

## 2023-11-22 RX ORDER — MORPHINE SULFATE 0.5 MG/ML
INJECTION, SOLUTION EPIDURAL; INTRATHECAL; INTRAVENOUS
Status: COMPLETED | OUTPATIENT
Start: 2023-11-22 | End: 2023-11-22

## 2023-11-22 RX ORDER — NALOXONE HYDROCHLORIDE 0.4 MG/ML
INJECTION, SOLUTION INTRAMUSCULAR; INTRAVENOUS; SUBCUTANEOUS PRN
Status: ACTIVE | OUTPATIENT
Start: 2023-11-22 | End: 2023-11-23

## 2023-11-22 RX ORDER — DEXTROSE MONOHYDRATE 100 MG/ML
INJECTION, SOLUTION INTRAVENOUS CONTINUOUS PRN
Status: DISCONTINUED | OUTPATIENT
Start: 2023-11-22 | End: 2023-11-25 | Stop reason: HOSPADM

## 2023-11-22 RX ORDER — ACETAMINOPHEN 325 MG/1
650 TABLET ORAL EVERY 6 HOURS
Status: DISPENSED | OUTPATIENT
Start: 2023-11-22 | End: 2023-11-23

## 2023-11-22 RX ORDER — OXYTOCIN/0.9 % SODIUM CHLORIDE 30/500 ML
87.3 PLASTIC BAG, INJECTION (ML) INTRAVENOUS CONTINUOUS PRN
Status: DISCONTINUED | OUTPATIENT
Start: 2023-11-22 | End: 2023-11-22

## 2023-11-22 RX ORDER — KETOROLAC TROMETHAMINE 30 MG/ML
30 INJECTION, SOLUTION INTRAMUSCULAR; INTRAVENOUS EVERY 6 HOURS
Status: DISCONTINUED | OUTPATIENT
Start: 2023-11-22 | End: 2023-11-23

## 2023-11-22 RX ORDER — BUPIVACAINE HYDROCHLORIDE 7.5 MG/ML
INJECTION, SOLUTION INTRASPINAL
Status: COMPLETED | OUTPATIENT
Start: 2023-11-22 | End: 2023-11-22

## 2023-11-22 RX ORDER — IBUPROFEN 800 MG/1
800 TABLET ORAL EVERY 8 HOURS SCHEDULED
Status: DISCONTINUED | OUTPATIENT
Start: 2023-11-23 | End: 2023-11-25 | Stop reason: HOSPADM

## 2023-11-22 RX ORDER — ACETAMINOPHEN 325 MG/1
975 TABLET ORAL ONCE
Status: COMPLETED | OUTPATIENT
Start: 2023-11-22 | End: 2023-11-22

## 2023-11-22 RX ORDER — ONDANSETRON 2 MG/ML
4 INJECTION INTRAMUSCULAR; INTRAVENOUS EVERY 6 HOURS PRN
Status: DISCONTINUED | OUTPATIENT
Start: 2023-11-22 | End: 2023-11-22

## 2023-11-22 RX ORDER — METRONIDAZOLE 500 MG/1
500 TABLET ORAL EVERY 8 HOURS SCHEDULED
Status: DISPENSED | OUTPATIENT
Start: 2023-11-22 | End: 2023-11-24

## 2023-11-22 RX ORDER — PRENATAL WITH FERROUS FUM AND FOLIC ACID 3080; 920; 120; 400; 22; 1.84; 3; 20; 10; 1; 12; 200; 27; 25; 2 [IU]/1; [IU]/1; MG/1; [IU]/1; MG/1; MG/1; MG/1; MG/1; MG/1; MG/1; UG/1; MG/1; MG/1; MG/1; MG/1
1 TABLET ORAL DAILY
Status: DISCONTINUED | OUTPATIENT
Start: 2023-11-22 | End: 2023-11-25 | Stop reason: HOSPADM

## 2023-11-22 RX ORDER — ONDANSETRON 4 MG/1
4 TABLET, ORALLY DISINTEGRATING ORAL EVERY 8 HOURS PRN
Status: DISCONTINUED | OUTPATIENT
Start: 2023-11-23 | End: 2023-11-25 | Stop reason: HOSPADM

## 2023-11-22 RX ORDER — ONDANSETRON 2 MG/ML
4 INJECTION INTRAMUSCULAR; INTRAVENOUS EVERY 6 HOURS PRN
Status: ACTIVE | OUTPATIENT
Start: 2023-11-22 | End: 2023-11-23

## 2023-11-22 RX ORDER — SODIUM CHLORIDE, SODIUM LACTATE, POTASSIUM CHLORIDE, CALCIUM CHLORIDE 600; 310; 30; 20 MG/100ML; MG/100ML; MG/100ML; MG/100ML
INJECTION, SOLUTION INTRAVENOUS CONTINUOUS
Status: DISCONTINUED | OUTPATIENT
Start: 2023-11-22 | End: 2023-11-22

## 2023-11-22 RX ORDER — SODIUM CHLORIDE, SODIUM LACTATE, POTASSIUM CHLORIDE, AND CALCIUM CHLORIDE .6; .31; .03; .02 G/100ML; G/100ML; G/100ML; G/100ML
1000 INJECTION, SOLUTION INTRAVENOUS ONCE
Status: DISCONTINUED | OUTPATIENT
Start: 2023-11-22 | End: 2023-11-22

## 2023-11-22 RX ORDER — MISOPROSTOL 200 UG/1
800 TABLET ORAL PRN
Status: DISCONTINUED | OUTPATIENT
Start: 2023-11-22 | End: 2023-11-25 | Stop reason: HOSPADM

## 2023-11-22 RX ORDER — MISOPROSTOL 200 UG/1
TABLET ORAL
Status: COMPLETED
Start: 2023-11-22 | End: 2023-11-22

## 2023-11-22 RX ORDER — CITRIC ACID/SODIUM CITRATE 334-500MG
15 SOLUTION, ORAL ORAL ONCE
Status: COMPLETED | OUTPATIENT
Start: 2023-11-22 | End: 2023-11-22

## 2023-11-22 RX ORDER — METHYLERGONOVINE MALEATE 0.2 MG/ML
200 INJECTION INTRAVENOUS PRN
Status: DISCONTINUED | OUTPATIENT
Start: 2023-11-22 | End: 2023-11-25 | Stop reason: HOSPADM

## 2023-11-22 RX ORDER — CARBOPROST TROMETHAMINE 250 UG/ML
250 INJECTION, SOLUTION INTRAMUSCULAR PRN
Status: DISCONTINUED | OUTPATIENT
Start: 2023-11-22 | End: 2023-11-25 | Stop reason: HOSPADM

## 2023-11-22 RX ORDER — SODIUM CHLORIDE 9 MG/ML
INJECTION, SOLUTION INTRAVENOUS PRN
Status: DISCONTINUED | OUTPATIENT
Start: 2023-11-22 | End: 2023-11-22

## 2023-11-22 RX ORDER — SODIUM CHLORIDE 9 MG/ML
INJECTION, SOLUTION INTRAVENOUS PRN
Status: DISCONTINUED | OUTPATIENT
Start: 2023-11-22 | End: 2023-11-25 | Stop reason: HOSPADM

## 2023-11-22 RX ORDER — OXYTOCIN 10 [USP'U]/ML
INJECTION, SOLUTION INTRAMUSCULAR; INTRAVENOUS PRN
Status: DISCONTINUED | OUTPATIENT
Start: 2023-11-22 | End: 2023-11-22 | Stop reason: SDUPTHER

## 2023-11-22 RX ORDER — SODIUM CHLORIDE 0.9 % (FLUSH) 0.9 %
10 SYRINGE (ML) INJECTION PRN
Status: DISCONTINUED | OUTPATIENT
Start: 2023-11-22 | End: 2023-11-22

## 2023-11-22 RX ORDER — FERROUS SULFATE 325(65) MG
325 TABLET ORAL
Status: DISCONTINUED | OUTPATIENT
Start: 2023-11-23 | End: 2023-11-25 | Stop reason: HOSPADM

## 2023-11-22 RX ORDER — OXYTOCIN/0.9 % SODIUM CHLORIDE 30/500 ML
PLASTIC BAG, INJECTION (ML) INTRAVENOUS
Status: COMPLETED
Start: 2023-11-22 | End: 2023-11-22

## 2023-11-22 RX ORDER — DIPHENHYDRAMINE HYDROCHLORIDE 50 MG/ML
25 INJECTION INTRAMUSCULAR; INTRAVENOUS EVERY 6 HOURS PRN
Status: ACTIVE | OUTPATIENT
Start: 2023-11-23 | End: 2023-11-24

## 2023-11-22 RX ORDER — AZITHROMYCIN 500 MG/1
INJECTION, POWDER, LYOPHILIZED, FOR SOLUTION INTRAVENOUS PRN
Status: DISCONTINUED | OUTPATIENT
Start: 2023-11-22 | End: 2023-11-22 | Stop reason: SDUPTHER

## 2023-11-22 RX ORDER — BISACODYL 10 MG
10 SUPPOSITORY, RECTAL RECTAL DAILY PRN
Status: DISCONTINUED | OUTPATIENT
Start: 2023-11-22 | End: 2023-11-25 | Stop reason: HOSPADM

## 2023-11-22 RX ORDER — METOCLOPRAMIDE HYDROCHLORIDE 5 MG/ML
10 INJECTION INTRAMUSCULAR; INTRAVENOUS ONCE
Status: COMPLETED | OUTPATIENT
Start: 2023-11-22 | End: 2023-11-22

## 2023-11-22 RX ORDER — SODIUM CHLORIDE, SODIUM LACTATE, POTASSIUM CHLORIDE, CALCIUM CHLORIDE 600; 310; 30; 20 MG/100ML; MG/100ML; MG/100ML; MG/100ML
INJECTION, SOLUTION INTRAVENOUS CONTINUOUS PRN
Status: DISCONTINUED | OUTPATIENT
Start: 2023-11-22 | End: 2023-11-22 | Stop reason: SDUPTHER

## 2023-11-22 RX ORDER — OXYCODONE HYDROCHLORIDE 5 MG/1
5 TABLET ORAL EVERY 4 HOURS PRN
Status: DISCONTINUED | OUTPATIENT
Start: 2023-11-23 | End: 2023-11-25 | Stop reason: HOSPADM

## 2023-11-22 RX ORDER — SIMETHICONE 80 MG
80 TABLET,CHEWABLE ORAL EVERY 6 HOURS PRN
Status: DISCONTINUED | OUTPATIENT
Start: 2023-11-22 | End: 2023-11-25 | Stop reason: HOSPADM

## 2023-11-22 RX ORDER — ONDANSETRON 2 MG/ML
4 INJECTION INTRAMUSCULAR; INTRAVENOUS EVERY 6 HOURS PRN
Status: DISCONTINUED | OUTPATIENT
Start: 2023-11-23 | End: 2023-11-25 | Stop reason: HOSPADM

## 2023-11-22 RX ORDER — METHYLERGONOVINE MALEATE 0.2 MG/ML
200 INJECTION INTRAVENOUS ONCE
Status: COMPLETED | OUTPATIENT
Start: 2023-11-22 | End: 2023-11-22

## 2023-11-22 RX ADMIN — SODIUM CHLORIDE, POTASSIUM CHLORIDE, SODIUM LACTATE AND CALCIUM CHLORIDE: 600; 310; 30; 20 INJECTION, SOLUTION INTRAVENOUS at 13:45

## 2023-11-22 RX ADMIN — METOCLOPRAMIDE 10 MG: 5 INJECTION, SOLUTION INTRAMUSCULAR; INTRAVENOUS at 13:24

## 2023-11-22 RX ADMIN — ACETAMINOPHEN 975 MG: 325 TABLET ORAL at 13:24

## 2023-11-22 RX ADMIN — METHYLERGONOVINE MALEATE 200 MCG: 0.2 INJECTION, SOLUTION INTRAMUSCULAR; INTRAVENOUS at 15:43

## 2023-11-22 RX ADMIN — Medication 15 ML: at 13:22

## 2023-11-22 RX ADMIN — METRONIDAZOLE 500 MG: 500 TABLET ORAL at 21:22

## 2023-11-22 RX ADMIN — CEPHALEXIN 500 MG: 500 CAPSULE ORAL at 21:22

## 2023-11-22 RX ADMIN — MORPHINE SULFATE 0.2 MG: 0.5 INJECTION, SOLUTION EPIDURAL; INTRATHECAL; INTRAVENOUS at 13:35

## 2023-11-22 RX ADMIN — SODIUM CITRATE AND CITRIC ACID MONOHYDRATE 15 ML: 500; 334 SOLUTION ORAL at 13:22

## 2023-11-22 RX ADMIN — FAMOTIDINE 20 MG: 10 INJECTION, SOLUTION INTRAVENOUS at 13:24

## 2023-11-22 RX ADMIN — Medication 3000 MG: at 13:29

## 2023-11-22 RX ADMIN — Medication 87.3 MILLI-UNITS/MIN: at 14:53

## 2023-11-22 RX ADMIN — DOCUSATE SODIUM 100 MG: 100 CAPSULE, LIQUID FILLED ORAL at 21:22

## 2023-11-22 RX ADMIN — FENTANYL CITRATE 20 MCG: 50 INJECTION, SOLUTION INTRAMUSCULAR; INTRAVENOUS at 13:35

## 2023-11-22 RX ADMIN — SODIUM CHLORIDE, POTASSIUM CHLORIDE, SODIUM LACTATE AND CALCIUM CHLORIDE: 600; 310; 30; 20 INJECTION, SOLUTION INTRAVENOUS at 13:31

## 2023-11-22 RX ADMIN — OXYTOCIN 2 ML: 10 INJECTION, SOLUTION INTRAMUSCULAR; INTRAVENOUS at 14:06

## 2023-11-22 RX ADMIN — KETOROLAC TROMETHAMINE 30 MG: 60 INJECTION, SOLUTION INTRAMUSCULAR at 14:41

## 2023-11-22 RX ADMIN — SODIUM CHLORIDE, POTASSIUM CHLORIDE, SODIUM LACTATE AND CALCIUM CHLORIDE: 600; 310; 30; 20 INJECTION, SOLUTION INTRAVENOUS at 17:24

## 2023-11-22 RX ADMIN — ONDANSETRON 4 MG: 2 INJECTION INTRAMUSCULAR; INTRAVENOUS at 13:33

## 2023-11-22 RX ADMIN — AZITHROMYCIN MONOHYDRATE 500 MG: 500 INJECTION, POWDER, LYOPHILIZED, FOR SOLUTION INTRAVENOUS at 13:55

## 2023-11-22 RX ADMIN — BUPIVACAINE HYDROCHLORIDE 12 MG: 7.5 INJECTION INTRAVENOUS at 13:35

## 2023-11-22 RX ADMIN — Medication 15 MG: at 13:45

## 2023-11-22 RX ADMIN — MISOPROSTOL 1000 MCG: 200 TABLET ORAL at 15:35

## 2023-11-22 RX ADMIN — ACETAMINOPHEN 650 MG: 325 TABLET ORAL at 21:30

## 2023-11-22 RX ADMIN — KETOROLAC TROMETHAMINE 30 MG: 30 INJECTION, SOLUTION INTRAMUSCULAR; INTRAVENOUS at 14:41

## 2023-11-22 RX ADMIN — METOCLOPRAMIDE HYDROCHLORIDE 10 MG: 5 INJECTION INTRAMUSCULAR; INTRAVENOUS at 13:24

## 2023-11-22 ASSESSMENT — PAIN SCALES - GENERAL
PAINLEVEL_OUTOF10: 5
PAINLEVEL_OUTOF10: 3

## 2023-11-22 ASSESSMENT — PAIN DESCRIPTION - DESCRIPTORS: DESCRIPTORS: ACHING;DISCOMFORT

## 2023-11-22 ASSESSMENT — PAIN DESCRIPTION - LOCATION: LOCATION: ABDOMEN

## 2023-11-22 NOTE — PLAN OF CARE
Problem: Postpartum  Goal: Experiences normal postpartum course  Description:  Postpartum OB-Pregnancy care plan goal which identifies if the mother is experiencing a normal postpartum course  Outcome: Progressing  Flowsheets  Taken 2023 by Mayo Calderon RN  Experiences Normal Postpartum Course:   Monitor maternal vital signs   Assess uterine involution  Taken 2023 1424 by Alvaro Estes RN  Experiences Normal Postpartum Course:   Monitor maternal vital signs   Assess uterine involution     Problem: Postpartum  Goal: Appropriate maternal -  bonding  Description:  Postpartum OB-Pregnancy care plan goal which identifies if the mother and  are bonding appropriately  Outcome: Progressing  Note: Infant at bedside, mom is resting     Problem: Postpartum  Goal: Establishment of infant feeding pattern  Description:  Postpartum OB-Pregnancy care plan goal which identifies if the mother is establishing a feeding pattern with their   Outcome: Progressing  Flowsheets (Taken 2023)  Establishment of Infant Feeding Pattern: Assess breast/bottle feeding     Problem: Postpartum  Goal: Incisions, wounds, or drain sites healing without S/S of infection  Outcome: Progressing  Flowsheets (Taken 2023)  Incisions, Wounds, or Drain Sites Healing Without Sign and Symptoms of Infection:   TWICE DAILY: Assess and document skin integrity   TWICE DAILY: Assess and document dressing/incision, wound bed, drain sites and surrounding tissue     Problem: Pain  Goal: Verbalizes/displays adequate comfort level or baseline comfort level  Outcome: Progressing  Flowsheets  Taken 2023 by Mayo Calderon RN  Verbalizes/displays adequate comfort level or baseline comfort level:   Encourage patient to monitor pain and request assistance   Assess pain using appropriate pain scale   Administer analgesics based on type and severity of pain and evaluate response   Implement non-pharmacological measures as appropriate and evaluate response  Taken 11/22/2023 1424 by Carol Jackson RN  Verbalizes/displays adequate comfort level or baseline comfort level:   Encourage patient to monitor pain and request assistance   Assess pain using appropriate pain scale   Implement non-pharmacological measures as appropriate and evaluate response   Administer analgesics based on type and severity of pain and evaluate response     Problem: Infection - Adult  Goal: Absence of infection at discharge  Outcome: Progressing  Flowsheets  Taken 11/22/2023 1815 by Myles Florence RN  Absence of infection at discharge:   Assess and monitor for signs and symptoms of infection   Monitor lab/diagnostic results   Monitor all insertion sites i.e., indwelling lines, tubes and drains   Instruct and encourage patient and family to use good hand hygiene technique  Taken 11/22/2023 1424 by Carol Jackson RN  Absence of infection at discharge:   Assess and monitor for signs and symptoms of infection   Monitor lab/diagnostic results   Monitor all insertion sites i.e., indwelling lines, tubes and drains   Monitor endotracheal (as able) and nasal secretions for changes in amount and color   [place] appropriate cooling/warming therapies per order   Administer medications as ordered   Instruct and encourage patient and family to use good hand hygiene technique     Problem: Infection - Adult  Goal: Absence of infection during hospitalization  Outcome: Progressing  Flowsheets  Taken 11/22/2023 1815 by Myles Florence RN  Absence of infection during hospitalization:   Assess and monitor for signs and symptoms of infection   Monitor lab/diagnostic results   Monitor all insertion sites i.e., indwelling lines, tubes and drains   Instruct and encourage patient and family to use good hand hygiene technique  Taken 11/22/2023 1424 by Carol Jackson RN  Absence of infection during hospitalization:   Assess and monitor

## 2023-11-22 NOTE — FLOWSHEET NOTE
Dr. Barak Lane at desk. Notified that with last check RN noted a large amount of blood on pad. RN going to roll patient and weigh pad. PRN order for cytotec obtained.

## 2023-11-22 NOTE — PLAN OF CARE
Problem: Pain  Goal: Verbalizes/displays adequate comfort level or baseline comfort level  Outcome: Progressing  Flowsheets (Taken 2023)  Verbalizes/displays adequate comfort level or baseline comfort level:   Encourage patient to monitor pain and request assistance   Assess pain using appropriate pain scale   Implement non-pharmacological measures as appropriate and evaluate response   Administer analgesics based on type and severity of pain and evaluate response     Problem: Vaginal Birth or  Section  Goal: Fetal and maternal status remain reassuring during the birth process  Description:  Birth OB-Pregnancy care plan goal which identifies if the fetal and maternal status remain reassuring during the birth process  Outcome: Progressing  Flowsheets (Taken 2023)  Fetal and Maternal Status Remain Reassuring During the Birth Process:   Monitor vital signs   Monitor uterine activity   Surgical antibiotic prophylaxis ( delivery)   Deep vein thrombosis prophylaxis ( delivery)   Monitor fetal heart rate  Note: Vaginal prep, ancef, azithromycin, chg prep     Problem: Postpartum  Goal: Experiences normal postpartum course  Description:  Postpartum OB-Pregnancy care plan goal which identifies if the mother is experiencing a normal postpartum course  Outcome: Progressing  Flowsheets (Taken 2023)  Experiences Normal Postpartum Course:   Monitor maternal vital signs   Assess uterine involution     Problem: Infection - Adult  Goal: Absence of infection at discharge  Outcome: Progressing  Flowsheets (Taken 2023)  Absence of infection at discharge:   Assess and monitor for signs and symptoms of infection   Monitor lab/diagnostic results   Monitor all insertion sites i.e., indwelling lines, tubes and drains   Monitor endotracheal (as able) and nasal secretions for changes in amount and color   [place] appropriate cooling/warming therapies per order

## 2023-11-22 NOTE — FLOWSHEET NOTE
Pt arrives in wheelchair with complaint of her water breaking at 1150 today, pregnant with twins, edc 12/18/23, 36 2/7 weeks, has been feeling babies move, denies cramping and edith, scheduled for primary cbrith on 11/28/23, Patient to bathroom to void, informed of maternal drug testing policy in place on all laboring patients. Verbal consent received, paper consent to be signed and urine to be sent.

## 2023-11-22 NOTE — ANESTHESIA PRE PROCEDURE
Component Value Date/Time    WBC 9.0 09/19/2023 10:42 AM    RBC 4.28 09/19/2023 10:42 AM    RBC 4.50 05/02/2022 03:26 PM    HGB 11.9 09/19/2023 10:42 AM    HCT 36.3 09/19/2023 10:42 AM    MCV 84.8 09/19/2023 10:42 AM    RDW 13.9 05/02/2022 03:26 PM     09/19/2023 10:42 AM       CMP:   Lab Results   Component Value Date/Time     06/07/2023 12:59 PM    K 4.0 06/07/2023 12:59 PM     06/07/2023 12:59 PM    CO2 19 06/07/2023 12:59 PM    BUN 6 09/06/2023 12:40 PM    CREATININE 0.5 09/06/2023 12:40 PM    LABGLOM >60 09/06/2023 12:40 PM    GLUCOSE 119 06/07/2023 12:59 PM    PROT 6.4 10/09/2022 07:43 PM    CALCIUM 8.9 06/07/2023 12:59 PM    BILITOT 0.2 10/09/2022 07:43 PM    ALKPHOS 101 09/19/2023 10:42 AM    AST 9 09/19/2023 10:42 AM    ALT 7 09/19/2023 10:42 AM       POC Tests: No results for input(s): \"POCGLU\", \"POCNA\", \"POCK\", \"POCCL\", \"POCBUN\", \"POCHEMO\", \"POCHCT\" in the last 72 hours. Coags: No results found for: \"PROTIME\", \"INR\", \"APTT\"    HCG (If Applicable): No results found for: \"PREGTESTUR\", \"PREGSERUM\", \"HCG\", \"HCGQUANT\"     ABGs: No results found for: \"PHART\", \"PO2ART\", \"DQK6XAN\", \"OCW1VHZ\", \"BEART\", \"C0IWLHSG\"     Type & Screen (If Applicable):  Lab Results   Component Value Date    LABABO A 05/02/2022    [address] POS 06/07/2023       Drug/Infectious Status (If Applicable):  Lab Results   Component Value Date/Time    HEPCAB Negative 06/07/2023 12:59 PM       COVID-19 Screening (If Applicable):   Lab Results   Component Value Date/Time    COVID19 DETECTED 02/08/2023 09:57 AM           Anesthesia Evaluation   no history of anesthetic complications:   Airway: Mallampati: III  TM distance: >3 FB   Neck ROM: full  Mouth opening: > = 3 FB   Dental:          Pulmonary:normal exam              Patient did not smoke on day of surgery.                  Cardiovascular:    (+) hypertension:,                   Neuro/Psych:   (+) psychiatric history:            GI/Hepatic/Renal:   (+) morbid obesity

## 2023-11-22 NOTE — FLOWSHEET NOTE
Dr Day Ivey in to see pt, notified of rom, will schedule cbirth for as soon as we get her ready, surgery and anesthesia notified at , nursery team notified at 5, Gilbertochristina Tan CRNA in to see pt at [employer]

## 2023-11-22 NOTE — FLOWSHEET NOTE
Patients recovery over. Clean gown applied. Za/paul care provided. Patient repositioned in bed. Patient transported to mother baby by bed holding babies to 5A16. Report given at bedside to [facility]. Belongings transferred by significant other.

## 2023-11-22 NOTE — ANESTHESIA PROCEDURE NOTES
Spinal Block    Patient location during procedure: OB  End time: 11/22/2023 1:40 PM  Reason for block: primary anesthetic  Staffing  Performed: resident/CRNA   Anesthesiologist: Clement Stock MD  Resident/CRNA: MARIA LUZ Rucker - ESTER  Performed by: MARIA LUZ Rucker - CRNA  Authorized by: Clement Stock MD    Spinal Block  Patient position: sitting  Prep: ChloraPrep  Patient monitoring: continuous pulse ox and frequent blood pressure checks  Approach: midline  Location: L3/L4  Provider prep: sterile gloves and mask  Local infiltration: lidocaine  Needle  Needle type: Pencan   Needle gauge: 24 G  Needle length: 3.5 in  Assessment  Swirl obtained: Yes  CSF: clear  Attempts: 1  Hemodynamics: stable  Preanesthetic Checklist  Completed: patient identified, IV checked, site marked, risks and benefits discussed, surgical/procedural consents, equipment checked, pre-op evaluation, timeout performed, anesthesia consent given, oxygen available, monitors applied/VS acknowledged, fire risk safety assessment completed and verbalized and blood product R/B/A discussed and consented

## 2023-11-22 NOTE — FLOWSHEET NOTE
In from L/D by bed with infant in Mom's arms. With an IV of LR plain 1000 ml infusing well, with a side iv of  ml = 30 units Pitocin infusing well. Oriented to the room, post op teaching started. Assessment done, lungs are clear. Abdomen is soft, no bowel sounds no gas. Abdominal dressing is clan and dry. Fundus I sfirm and cnetered. Perineal care done, small amount of red lochia. Raymundo catheter inact and draining yellow concentrated urine. Pain rated at \"5\", pain goal is \"8\". SCD on. Denies need at this thei time.  Menu given

## 2023-11-22 NOTE — DISCHARGE SUMMARY
C/Section Discharge Summary    Admitting diagnosis: IUP    Gestational Age:36w2d    Antepartum complications: di-di twins, type II diabetes, hx of shoulder dystocia    Date of Admission: 2023 12:30 PM      Type of Delivery:  section - primary    Labs: CBC   Lab Results   Component Value Date    WBC 6.7 2023    HGB 11.6 (L) 2023    HCT 36.9 (L) 2023     2023        Intrapartum complications: PPROM, breech presentation both twins    Postpartum complications: none    The patient is ambulating well. The patient is tolerating a normal diet. Discharge Medication:      Medication List        START taking these medications      ibuprofen 800 MG tablet  Commonly known as: ADVIL;MOTRIN  Take 1 tablet by mouth 3 times daily (with meals)     oxyCODONE-acetaminophen 5-325 MG per tablet  Commonly known as: Percocet  Take 1 tablet by mouth every 6 hours as needed for Pain for up to 7 days. Intended supply: 7 days. Take lowest dose possible to manage pain Max Daily Amount: 4 tablets            CHANGE how you take these medications      insulin glargine 100 UNIT/ML injection vial  Commonly known as: Lantus  Inject 12 Units into the skin nightly  What changed:   medication strength  how much to take            CONTINUE taking these medications      PRENATAL 1 PO               Where to Get Your Medications        These medications were sent to [address] [place] [address] #[id number] - [address] [phone]  [address] [place] [address]      Phone: [phone]   ibuprofen 800 MG tablet  insulin glargine 100 UNIT/ML injection vial  oxyCODONE-acetaminophen 5-325 MG per tablet          Patient Instructions:    Activity: no sex for 6 weeks, no driving while on narcotic pain meds, no heavy lifting for 6 weeks  Diet: regular  Wound Care: keep wound clean and dry and ice to area for comfort    Condition: Good    Plan:   Follow up in 1 week(s)    Electronically signed by Vipul Sharpe DO on 11/22/2023 at 3:22 PM

## 2023-11-22 NOTE — OP NOTE
Department of Obstetrics and Gynecology   Section Note        Pt Name: Joana Gonsalez  MRN: [id number] [facility] #: [de-identified]  YOB: 2000      Indications: malpresentation - breech twins, PPROM and latent labor at 36w2d, hx of shoulder dystocia    Pre-operative Diagnosis: 39 week pregnancy, di-di twins, breech presentation of both twins, PPROM, type II diabetes    Post-operative Diagnosis:  Same, Delivered, Living  Male and Female    PMH:  Past Medical History:   Diagnosis Date    Anemia     takes extra iron    Depression     Gestational hypertension     was with first baby and was on meds    Insulin controlled gestational diabetes mellitus (GDM) during pregnancy        Procedure:  primary low transverse  section    Procedure Performed By: Sonali Hardy DO    Findings:  Normal tubes, ovaries and uterus. Bridger breech presentation of twin A and footling breech presentation of twin B. Estimated Blood Loss:  850ml           Specimens: placentas sent to pathology       Complications:  None           Condition: infants stable to general nursery and mother stable      Indications:     Joana Gonsalez is a 21 y.o. female  at 41w1d who presented for   section for  malpresentation - breech, SROM and hx of shoulder dystocia. She understood the  risks and benefits and signed informed consent. Procedure: The patient was taken to the Operating Room where spinal anesthesia was established. The patient was placed on the operating table in the supine position with a left tilt of the hips. She was then prepped and draped in the usual sterile fashion. A Pfannenstiel incision was made in the skin with a scalpel and carried out over subsequent layers of tissue including the fascia. The rectus muscles were then divided in the midline and the peritoneum was identified and entered bluntly at its superior margin.  The peritoneal incision was extended using the curved pink Family

## 2023-11-22 NOTE — FLOWSHEET NOTE
Dr. Tevin Hanna sent text, returned phone call. Notified that when RN rolled patient when RN administered rectal cytotec. RN laid patient supine and expressed a large amount of clots. Fundus is now firm at this time. Orders received for [place].

## 2023-11-22 NOTE — FLOWSHEET NOTE
Dr. Italo Amaya at bedside. Informed consent for primary  section obtained for multiple gestation and breech position. Informed consent for blood products and UDS also obtained.

## 2023-11-22 NOTE — H&P
[facility]  History and Physical Update    Pt Name: Woodrow Elizalde  MRN: [id number]  YOB: 2000  Date of evaluation: 2023    [] I have examined the patient and reviewed the H&P/Consult and there are no changes to the patient or plans. [x] I have examined the patient and reviewed the H&P/Consult and have noted the following changes:   23yo  @36w2d with di-di twins, presented to labor and delivery with SROM that occurred around 11:50AM and early labor. Amnisure positive. Pregnancy complicated by type II diabetes. Pt is on 24u Lantus at night. She has followed with MFM and blood sugars have been well controlled this pregnancy. Pt has hx of shoulder dystocia resulting in temporary brachial plexus palsy in baby with prior pregnancy. Pt opted for primary  this pregnancy. FHTs category 1 for both babies when tracing. Ctx q2-4 minutes. R/B/A of  discussed and consent signed. All questions answered. Discussion with the patient and/ or family for proposed care, treatment, services; benefits, risks, side effects; likelihood of achieving goals and potential problems that may occur during recuperation was had and all questions were answered. Discussion with the patient and/ or family of reasonable alternatives to the proposed care, treatment, services and the discussion of the risks, benefits, side effects related to the alternatives and the risk related to not receiving the proposed care treatment services was also had and all questions were answered. If this is for an elective surgical procedure then The patient was counseled at length about the risks of edith Covid-19 during their perioperative period and any recovery window from their procedure. The patient was made aware that edith Covid-19  may worsen their prognosis for recovering from their procedure  and lend to a higher morbidity and/or mortality risk.   All material risks, benefits, and

## 2023-11-23 LAB
DEPRECATED RDW RBC AUTO: 45.1 FL (ref 35–45)
ERYTHROCYTE [DISTWIDTH] IN BLOOD BY AUTOMATED COUNT: 15.1 % (ref 11.5–14.5)
GLUCOSE BLD STRIP.AUTO-MCNC: 120 MG/DL (ref 70–108)
GLUCOSE BLD STRIP.AUTO-MCNC: 124 MG/DL (ref 70–108)
GLUCOSE BLD STRIP.AUTO-MCNC: 91 MG/DL (ref 70–108)
GLUCOSE BLD STRIP.AUTO-MCNC: 97 MG/DL (ref 70–108)
HCT VFR BLD AUTO: 26.3 % (ref 37–47)
HGB BLD-MCNC: 8.3 GM/DL (ref 12–16)
MCH RBC QN AUTO: 26 PG (ref 26–33)
MCHC RBC AUTO-ENTMCNC: 31.6 GM/DL (ref 32.2–35.5)
MCV RBC AUTO: 82.4 FL (ref 81–99)
PLATELET # BLD AUTO: 131 THOU/MM3 (ref 130–400)
PMV BLD AUTO: 14.4 FL (ref 9.4–12.4)
RBC # BLD AUTO: 3.19 MILL/MM3 (ref 4.2–5.4)
RPR SER QL: NONREACTIVE
WBC # BLD AUTO: 8 THOU/MM3 (ref 4.8–10.8)

## 2023-11-23 PROCEDURE — 6360000002 HC RX W HCPCS: Performed by: ANESTHESIOLOGY

## 2023-11-23 PROCEDURE — 6370000000 HC RX 637 (ALT 250 FOR IP): Performed by: STUDENT IN AN ORGANIZED HEALTH CARE EDUCATION/TRAINING PROGRAM

## 2023-11-23 PROCEDURE — 85027 COMPLETE CBC AUTOMATED: CPT

## 2023-11-23 PROCEDURE — 2580000003 HC RX 258: Performed by: STUDENT IN AN ORGANIZED HEALTH CARE EDUCATION/TRAINING PROGRAM

## 2023-11-23 PROCEDURE — 1220000000 HC SEMI PRIVATE OB R&B

## 2023-11-23 PROCEDURE — 6360000002 HC RX W HCPCS

## 2023-11-23 PROCEDURE — 82948 REAGENT STRIP/BLOOD GLUCOSE: CPT

## 2023-11-23 PROCEDURE — 6370000000 HC RX 637 (ALT 250 FOR IP): Performed by: ANESTHESIOLOGY

## 2023-11-23 PROCEDURE — 36415 COLL VENOUS BLD VENIPUNCTURE: CPT

## 2023-11-23 RX ORDER — INSULIN LISPRO 100 [IU]/ML
0-4 INJECTION, SOLUTION INTRAVENOUS; SUBCUTANEOUS NIGHTLY
Status: DISCONTINUED | OUTPATIENT
Start: 2023-11-23 | End: 2023-11-25 | Stop reason: HOSPADM

## 2023-11-23 RX ORDER — DIPHENHYDRAMINE HYDROCHLORIDE 50 MG/ML
25 INJECTION INTRAMUSCULAR; INTRAVENOUS EVERY 6 HOURS PRN
Status: DISCONTINUED | OUTPATIENT
Start: 2023-11-23 | End: 2023-11-25 | Stop reason: HOSPADM

## 2023-11-23 RX ORDER — INSULIN LISPRO 100 [IU]/ML
0-4 INJECTION, SOLUTION INTRAVENOUS; SUBCUTANEOUS
Status: DISCONTINUED | OUTPATIENT
Start: 2023-11-23 | End: 2023-11-25 | Stop reason: HOSPADM

## 2023-11-23 RX ADMIN — KETOROLAC TROMETHAMINE 30 MG: 30 INJECTION, SOLUTION INTRAMUSCULAR at 13:40

## 2023-11-23 RX ADMIN — OXYCODONE HYDROCHLORIDE 10 MG: 5 TABLET ORAL at 17:08

## 2023-11-23 RX ADMIN — METRONIDAZOLE 500 MG: 500 TABLET ORAL at 13:44

## 2023-11-23 RX ADMIN — DOCUSATE SODIUM 100 MG: 100 CAPSULE, LIQUID FILLED ORAL at 09:01

## 2023-11-23 RX ADMIN — DOCUSATE SODIUM 100 MG: 100 CAPSULE, LIQUID FILLED ORAL at 22:00

## 2023-11-23 RX ADMIN — ACETAMINOPHEN 1000 MG: 500 TABLET ORAL at 17:09

## 2023-11-23 RX ADMIN — SODIUM CHLORIDE, POTASSIUM CHLORIDE, SODIUM LACTATE AND CALCIUM CHLORIDE: 600; 310; 30; 20 INJECTION, SOLUTION INTRAVENOUS at 08:56

## 2023-11-23 RX ADMIN — FERROUS SULFATE TAB 325 MG (65 MG ELEMENTAL FE) 325 MG: 325 (65 FE) TAB at 09:01

## 2023-11-23 RX ADMIN — OXYCODONE HYDROCHLORIDE 10 MG: 5 TABLET ORAL at 22:00

## 2023-11-23 RX ADMIN — CEPHALEXIN 500 MG: 500 CAPSULE ORAL at 13:43

## 2023-11-23 RX ADMIN — CEPHALEXIN 500 MG: 500 CAPSULE ORAL at 07:00

## 2023-11-23 RX ADMIN — PRENATAL WITH FERROUS FUM AND FOLIC ACID 1 TABLET: 3080; 920; 120; 400; 22; 1.84; 3; 20; 10; 1; 12; 200; 27; 25; 2 TABLET ORAL at 09:01

## 2023-11-23 RX ADMIN — OXYCODONE HYDROCHLORIDE 5 MG: 5 TABLET ORAL at 09:02

## 2023-11-23 RX ADMIN — DIPHENHYDRAMINE HYDROCHLORIDE 25 MG: 50 INJECTION INTRAMUSCULAR; INTRAVENOUS at 04:32

## 2023-11-23 RX ADMIN — KETOROLAC TROMETHAMINE 30 MG: 30 INJECTION, SOLUTION INTRAMUSCULAR at 06:47

## 2023-11-23 RX ADMIN — ACETAMINOPHEN 650 MG: 325 TABLET ORAL at 09:01

## 2023-11-23 RX ADMIN — METRONIDAZOLE 500 MG: 500 TABLET ORAL at 22:00

## 2023-11-23 RX ADMIN — METRONIDAZOLE 500 MG: 500 TABLET ORAL at 07:40

## 2023-11-23 RX ADMIN — OXYCODONE HYDROCHLORIDE 10 MG: 5 TABLET ORAL at 13:41

## 2023-11-23 RX ADMIN — IBUPROFEN 800 MG: 800 TABLET, FILM COATED ORAL at 22:00

## 2023-11-23 RX ADMIN — CEPHALEXIN 500 MG: 500 CAPSULE ORAL at 22:00

## 2023-11-23 ASSESSMENT — PAIN DESCRIPTION - DESCRIPTORS
DESCRIPTORS: ACHING;DISCOMFORT
DESCRIPTORS: ACHING;SORE
DESCRIPTORS: ACHING;SORE
DESCRIPTORS: SORE
DESCRIPTORS: ACHING;SORE

## 2023-11-23 ASSESSMENT — PAIN DESCRIPTION - ORIENTATION: ORIENTATION: LOWER

## 2023-11-23 ASSESSMENT — PAIN DESCRIPTION - LOCATION
LOCATION: ABDOMEN
LOCATION: INCISION
LOCATION: ABDOMEN;INCISION
LOCATION: INCISION
LOCATION: INCISION

## 2023-11-23 ASSESSMENT — PAIN SCALES - GENERAL
PAINLEVEL_OUTOF10: 3
PAINLEVEL_OUTOF10: 7
PAINLEVEL_OUTOF10: 5
PAINLEVEL_OUTOF10: 4
PAINLEVEL_OUTOF10: 3
PAINLEVEL_OUTOF10: 7
PAINLEVEL_OUTOF10: 4
PAINLEVEL_OUTOF10: 5
PAINLEVEL_OUTOF10: 7
PAINLEVEL_OUTOF10: 4

## 2023-11-23 ASSESSMENT — PAIN - FUNCTIONAL ASSESSMENT
PAIN_FUNCTIONAL_ASSESSMENT: ACTIVITIES ARE NOT PREVENTED

## 2023-11-23 ASSESSMENT — PAIN DESCRIPTION - ONSET: ONSET: ON-GOING

## 2023-11-23 ASSESSMENT — PAIN DESCRIPTION - FREQUENCY: FREQUENCY: CONTINUOUS

## 2023-11-23 ASSESSMENT — PAIN DESCRIPTION - PAIN TYPE: TYPE: SURGICAL PAIN

## 2023-11-23 NOTE — PLAN OF CARE
Problem: Postpartum  Goal: Experiences normal postpartum course  Description:  Postpartum OB-Pregnancy care plan goal which identifies if the mother is experiencing a normal postpartum course  2023 by Benita Cameron RN  Outcome: Progressing  Flowsheets (Taken 2023)  Experiences Normal Postpartum Course:   Monitor maternal vital signs   Assess uterine involution     Problem: Postpartum  Goal: Appropriate maternal -  bonding  Description:  Postpartum OB-Pregnancy care plan goal which identifies if the mother and  are bonding appropriately  2023 by Benita Cameron RN  Outcome: Progressing     Problem: Postpartum  Goal: Establishment of infant feeding pattern  Description:  Postpartum OB-Pregnancy care plan goal which identifies if the mother is establishing a feeding pattern with their   2023 by Benita Cameron RN  Outcome: Progressing  Flowsheets (Taken 2023)  Establishment of Infant Feeding Pattern:   Assess breast/bottle feeding   Refer to lactation as needed     Problem: Postpartum  Goal: Incisions, wounds, or drain sites healing without S/S of infection  2023 by Benita Cameron RN  Outcome: Progressing  Flowsheets (Taken 2023)  Incisions, Wounds, or Drain Sites Healing Without Sign and Symptoms of Infection: TWICE DAILY: Assess and document skin integrity     Problem: Pain  Goal: Verbalizes/displays adequate comfort level or baseline comfort level  2023 by Benita Cameron RN  Outcome: Progressing  Flowsheets (Taken 2023)  Verbalizes/displays adequate comfort level or baseline comfort level:   Encourage patient to monitor pain and request assistance   Assess pain using appropriate pain scale   Administer analgesics based on type and severity of pain and evaluate response   Implement non-pharmacological measures as appropriate and evaluate response     Problem: Infection -

## 2023-11-23 NOTE — FLOWSHEET NOTE
Up to the bathroom for the second time, voided well. Perineal care done, small amount of red lochia FUndus is firm and centered.  Denies need at this time

## 2023-11-23 NOTE — FLOWSHEET NOTE
Both infants have roomed in with mother this shift except for maternal exhaustion. Benefits of rooming in discussed.

## 2023-11-23 NOTE — ANESTHESIA POSTPROCEDURE EVALUATION
Department of Anesthesiology  Postprocedure Note    Patient: Na Olsen  MRN: [id number]  YOB: 2000  Date of evaluation: 2023      Procedure Summary     Date: 23 Room / Location: [facility] OR [address] [place] [address]    Anesthesia Start: 1331 Anesthesia Stop: 08    Procedure: Primary  Section - Twins (Uterus) Diagnosis:       Twin pregnancy, dichorionic/diamniotic, second trimester      (Twin pregnancy, dichorionic/diamniotic, second trimester [O30.042])    Surgeons: Deb Angel DO Responsible Provider: Pablo Weinberg MD    Anesthesia Type: spinal ASA Status: 3          Anesthesia Type: No value filed.     Yimi Phase I: Yimi Score: 9    Yimi Phase II: Yimi Score: 10      Anesthesia Post Evaluation    Patient location during evaluation: bedside  Patient participation: complete - patient participated  Level of consciousness: awake and alert  Airway patency: patent  Nausea & Vomiting: no nausea and no vomiting  Complications: no  Cardiovascular status: hemodynamically stable  Respiratory status: spontaneous ventilation, room air and nonlabored ventilation  Hydration status: stable  Pain management: adequate

## 2023-11-23 NOTE — FLOWSHEET NOTE
Pt ambulated to bathroom with assist. Za care done and new pads applied. Raymundo removed at this time. Pt returned to bed. Fundus firm, midline, U/U. Small lochia noted.

## 2023-11-23 NOTE — PLAN OF CARE
Problem: Pain  Goal: Verbalizes/displays adequate comfort level or baseline comfort level  Recent Flowsheet Documentation  Taken 11/23/2023 1040 by Katalina Ramon RN  Verbalizes/displays adequate comfort level or baseline comfort level:   Encourage patient to monitor pain and request assistance   Assess pain using appropriate pain scale   Administer analgesics based on type and severity of pain and evaluate response   Implement non-pharmacological measures as appropriate and evaluate response  Taken 11/22/2023 2236 by Herve Rivas RN  Verbalizes/displays adequate comfort level or baseline comfort level:   Encourage patient to monitor pain and request assistance   Assess pain using appropriate pain scale   Administer analgesics based on type and severity of pain and evaluate response   Implement non-pharmacological measures as appropriate and evaluate response     Problem: Postpartum  Goal: Experiences normal postpartum course  Description:  Postpartum OB-Pregnancy care plan goal which identifies if the mother is experiencing a normal postpartum course  Recent Flowsheet Documentation  Taken 11/23/2023 1040 by Katalina Ramon RN  Experiences Normal Postpartum Course:   Monitor maternal vital signs   Assess uterine involution  Taken 11/22/2023 2236 by Herve Rivas RN  Experiences Normal Postpartum Course:   Monitor maternal vital signs   Assess uterine involution     Problem: Infection - Adult  Goal: Absence of infection at discharge  Recent Flowsheet Documentation  Taken 11/23/2023 1040 by Katalina Ramon RN  Absence of infection at discharge:   Assess and monitor for signs and symptoms of infection   Monitor lab/diagnostic results   Monitor all insertion sites i.e., indwelling lines, tubes and drains   Instruct and encourage patient and family to use good hand hygiene technique  Taken 11/22/2023 2236 by Herve Rivas RN  Absence of infection at discharge:   Assess and monitor for signs and

## 2023-11-24 LAB
GLUCOSE BLD STRIP.AUTO-MCNC: 105 MG/DL (ref 70–108)
GLUCOSE BLD STRIP.AUTO-MCNC: 108 MG/DL (ref 70–108)
GLUCOSE BLD STRIP.AUTO-MCNC: 112 MG/DL (ref 70–108)
GLUCOSE BLD STRIP.AUTO-MCNC: 136 MG/DL (ref 70–108)
GLUCOSE BLD STRIP.AUTO-MCNC: 161 MG/DL (ref 70–108)
GLUCOSE BLD STRIP.AUTO-MCNC: 97 MG/DL (ref 70–108)

## 2023-11-24 PROCEDURE — 82948 REAGENT STRIP/BLOOD GLUCOSE: CPT

## 2023-11-24 PROCEDURE — 6370000000 HC RX 637 (ALT 250 FOR IP): Performed by: STUDENT IN AN ORGANIZED HEALTH CARE EDUCATION/TRAINING PROGRAM

## 2023-11-24 PROCEDURE — 1220000000 HC SEMI PRIVATE OB R&B

## 2023-11-24 RX ADMIN — ACETAMINOPHEN 1000 MG: 500 TABLET ORAL at 02:10

## 2023-11-24 RX ADMIN — FERROUS SULFATE TAB 325 MG (65 MG ELEMENTAL FE) 325 MG: 325 (65 FE) TAB at 09:47

## 2023-11-24 RX ADMIN — ACETAMINOPHEN 1000 MG: 500 TABLET ORAL at 17:22

## 2023-11-24 RX ADMIN — ACETAMINOPHEN 1000 MG: 500 TABLET ORAL at 09:47

## 2023-11-24 RX ADMIN — IBUPROFEN 800 MG: 800 TABLET, FILM COATED ORAL at 16:12

## 2023-11-24 RX ADMIN — CEPHALEXIN 500 MG: 500 CAPSULE ORAL at 06:35

## 2023-11-24 RX ADMIN — OXYCODONE HYDROCHLORIDE 5 MG: 5 TABLET ORAL at 13:03

## 2023-11-24 RX ADMIN — IBUPROFEN 800 MG: 800 TABLET, FILM COATED ORAL at 06:36

## 2023-11-24 RX ADMIN — OXYCODONE HYDROCHLORIDE 10 MG: 5 TABLET ORAL at 06:36

## 2023-11-24 RX ADMIN — DOCUSATE SODIUM 100 MG: 100 CAPSULE, LIQUID FILLED ORAL at 09:47

## 2023-11-24 RX ADMIN — DOCUSATE SODIUM 100 MG: 100 CAPSULE, LIQUID FILLED ORAL at 21:35

## 2023-11-24 RX ADMIN — PRENATAL WITH FERROUS FUM AND FOLIC ACID 1 TABLET: 3080; 920; 120; 400; 22; 1.84; 3; 20; 10; 1; 12; 200; 27; 25; 2 TABLET ORAL at 09:47

## 2023-11-24 RX ADMIN — OXYCODONE HYDROCHLORIDE 10 MG: 5 TABLET ORAL at 02:09

## 2023-11-24 RX ADMIN — OXYCODONE HYDROCHLORIDE 10 MG: 5 TABLET ORAL at 17:23

## 2023-11-24 RX ADMIN — METRONIDAZOLE 500 MG: 500 TABLET ORAL at 06:36

## 2023-11-24 RX ADMIN — OXYCODONE HYDROCHLORIDE 10 MG: 5 TABLET ORAL at 21:35

## 2023-11-24 ASSESSMENT — PAIN SCALES - GENERAL
PAINLEVEL_OUTOF10: 6
PAINLEVEL_OUTOF10: 7
PAINLEVEL_OUTOF10: 2

## 2023-11-24 ASSESSMENT — PAIN DESCRIPTION - LOCATION
LOCATION: ABDOMEN;INCISION
LOCATION: INCISION
LOCATION: ABDOMEN;INCISION

## 2023-11-24 ASSESSMENT — PAIN DESCRIPTION - DESCRIPTORS
DESCRIPTORS: SORE;ACHING;DISCOMFORT
DESCRIPTORS: THROBBING
DESCRIPTORS: ACHING
DESCRIPTORS: CRAMPING
DESCRIPTORS: CRAMPING;TENDER
DESCRIPTORS: ACHING;SORE;DISCOMFORT;TENDER

## 2023-11-24 ASSESSMENT — PAIN - FUNCTIONAL ASSESSMENT
PAIN_FUNCTIONAL_ASSESSMENT: ACTIVITIES ARE NOT PREVENTED

## 2023-11-24 ASSESSMENT — PAIN DESCRIPTION - ORIENTATION
ORIENTATION: LOWER
ORIENTATION: MID;LOWER

## 2023-11-24 NOTE — PLAN OF CARE
Care plan reviewed with patient and she contributes to goal setting and voices understanding of plan of care.    Problem: Postpartum  Goal: Experiences normal postpartum course  Description:  Postpartum OB-Pregnancy care plan goal which identifies if the mother is experiencing a normal postpartum course  2023 1023 by Roc Hill RN  Outcome: Progressing  Flowsheets (Taken 2023 7763 by Maddie Ruiz RN)  Experiences Normal Postpartum Course:   Monitor maternal vital signs   Assess uterine involution  Note: See flowsheet     Problem: Postpartum  Goal: Appropriate maternal -  bonding  Description:  Postpartum OB-Pregnancy care plan goal which identifies if the mother and  are bonding appropriately  2023 1023 by Roc Hill RN  Outcome: Progressing  Note: Infant roomed in with mother and father     Problem: Postpartum  Goal: Establishment of infant feeding pattern  Description:  Postpartum OB-Pregnancy care plan goal which identifies if the mother is establishing a feeding pattern with their   2023 1023 by Roc Hill RN  Outcome: Progressing  [address] (Taken 2023 5759 by Maddie Ruiz RN)  Establishment of Infant Feeding Pattern: Assess breast/bottle feeding  Note: Infant fed on demand     Problem: Postpartum  Goal: Incisions, wounds, or drain sites healing without S/S of infection  2023 1023 by Roc Hill RN  Outcome: Progressing  [address] (Taken 2023 1938 by Maddie Ruiz, RN)  Incisions, Wounds, or Drain Sites Healing Without Sign and Symptoms of Infection: TWICE DAILY: Assess and document skin integrity  Note: Silver dressing with 1 scant area on R side     Problem: Pain  Goal: Verbalizes/displays adequate comfort level or baseline comfort level  2023 1023 by Roc Hill RN  Outcome: Progressing  [address] (Taken 2023 4344 by Maddie Ruiz RN)  Verbalizes/displays adequate comfort level or baseline

## 2023-11-24 NOTE — PLAN OF CARE
Problem: Postpartum  Goal: Experiences normal postpartum course  Description:  Postpartum OB-Pregnancy care plan goal which identifies if the mother is experiencing a normal postpartum course  2023 4569 by Junaid Cabrera RN  Outcome: Progressing  Flowsheets (Taken 2023)  Experiences Normal Postpartum Course:   Monitor maternal vital signs   Assess uterine involution     Problem: Postpartum  Goal: Appropriate maternal -  bonding  Description:  Postpartum OB-Pregnancy care plan goal which identifies if the mother and  are bonding appropriately  2023 6528 by Junaid Cabrera RN  Outcome: Progressing  Note: Assess bonding     Problem: Postpartum  Goal: Establishment of infant feeding pattern  Description:  Postpartum OB-Pregnancy care plan goal which identifies if the mother is establishing a feeding pattern with their   2023 2284 by Junaid Cabrera RN  Outcome: Progressing  Flowsheets (Taken 2023)  Establishment of Infant Feeding Pattern: Assess breast/bottle feeding     Problem: Postpartum  Goal: Incisions, wounds, or drain sites healing without S/S of infection  2023 8370 by Junaid Cabrera RN  Outcome: Progressing  Flowsheets (Taken 2023)  Incisions, Wounds, or Drain Sites Healing Without Sign and Symptoms of Infection: TWICE DAILY: Assess and document skin integrity     Problem: Pain  Goal: Verbalizes/displays adequate comfort level or baseline comfort level  2023 7794 by Junaid Cabrera RN  Outcome: Progressing  Flowsheets (Taken 2023)  Verbalizes/displays adequate comfort level or baseline comfort level:   Encourage patient to monitor pain and request assistance   Assess pain using appropriate pain scale   Administer analgesics based on type and severity of pain and evaluate response   Implement non-pharmacological measures as appropriate and evaluate response     Problem: Infection - Adult  Goal: Absence of infection at

## 2023-11-25 VITALS
HEART RATE: 100 BPM | TEMPERATURE: 96.4 F | HEIGHT: 67 IN | RESPIRATION RATE: 16 BRPM | OXYGEN SATURATION: 95 % | BODY MASS INDEX: 45.99 KG/M2 | SYSTOLIC BLOOD PRESSURE: 132 MMHG | WEIGHT: 293 LBS | DIASTOLIC BLOOD PRESSURE: 86 MMHG

## 2023-11-25 LAB
GLUCOSE BLD STRIP.AUTO-MCNC: 105 MG/DL (ref 70–108)
GLUCOSE BLD STRIP.AUTO-MCNC: 113 MG/DL (ref 70–108)
GLUCOSE BLD STRIP.AUTO-MCNC: 115 MG/DL (ref 70–108)

## 2023-11-25 PROCEDURE — 82948 REAGENT STRIP/BLOOD GLUCOSE: CPT

## 2023-11-25 PROCEDURE — 6370000000 HC RX 637 (ALT 250 FOR IP): Performed by: STUDENT IN AN ORGANIZED HEALTH CARE EDUCATION/TRAINING PROGRAM

## 2023-11-25 RX ADMIN — IBUPROFEN 800 MG: 800 TABLET, FILM COATED ORAL at 00:45

## 2023-11-25 RX ADMIN — FERROUS SULFATE TAB 325 MG (65 MG ELEMENTAL FE) 325 MG: 325 (65 FE) TAB at 07:56

## 2023-11-25 RX ADMIN — PRENATAL WITH FERROUS FUM AND FOLIC ACID 1 TABLET: 3080; 920; 120; 400; 22; 1.84; 3; 20; 10; 1; 12; 200; 27; 25; 2 TABLET ORAL at 07:55

## 2023-11-25 RX ADMIN — OXYCODONE HYDROCHLORIDE 5 MG: 5 TABLET ORAL at 10:35

## 2023-11-25 RX ADMIN — ACETAMINOPHEN 1000 MG: 500 TABLET ORAL at 10:34

## 2023-11-25 RX ADMIN — ACETAMINOPHEN 1000 MG: 500 TABLET ORAL at 02:35

## 2023-11-25 RX ADMIN — DOCUSATE SODIUM 100 MG: 100 CAPSULE, LIQUID FILLED ORAL at 07:56

## 2023-11-25 RX ADMIN — OXYCODONE HYDROCHLORIDE 10 MG: 5 TABLET ORAL at 02:35

## 2023-11-25 RX ADMIN — OXYCODONE HYDROCHLORIDE 10 MG: 5 TABLET ORAL at 06:33

## 2023-11-25 ASSESSMENT — PAIN SCALES - GENERAL
PAINLEVEL_OUTOF10: 3
PAINLEVEL_OUTOF10: 6
PAINLEVEL_OUTOF10: 7
PAINLEVEL_OUTOF10: 5
PAINLEVEL_OUTOF10: 3
PAINLEVEL_OUTOF10: 8
PAINLEVEL_OUTOF10: 3

## 2023-11-25 ASSESSMENT — PAIN DESCRIPTION - ONSET: ONSET: ON-GOING

## 2023-11-25 ASSESSMENT — PAIN DESCRIPTION - PAIN TYPE: TYPE: SURGICAL PAIN

## 2023-11-25 ASSESSMENT — PAIN DESCRIPTION - ORIENTATION
ORIENTATION: LOWER

## 2023-11-25 ASSESSMENT — PAIN - FUNCTIONAL ASSESSMENT
PAIN_FUNCTIONAL_ASSESSMENT: ACTIVITIES ARE NOT PREVENTED

## 2023-11-25 ASSESSMENT — PAIN DESCRIPTION - LOCATION
LOCATION: ABDOMEN;INCISION
LOCATION: INCISION

## 2023-11-25 ASSESSMENT — PAIN DESCRIPTION - FREQUENCY: FREQUENCY: CONTINUOUS

## 2023-11-25 ASSESSMENT — PAIN DESCRIPTION - DESCRIPTORS
DESCRIPTORS: SORE
DESCRIPTORS: SORE
DESCRIPTORS: ACHING
DESCRIPTORS: SORE

## 2023-11-25 NOTE — FLOWSHEET NOTE
Postpartum education brochure given, teaching complete. [place] postpartum depression screening discussed with patient. Patient instructed to complete [place] postpartum depression screening in 2 weeks and contact her healthcare provider if her score is > 10. Patient voiced understanding. Reviewed postpartum birth warning signs flyer with patient. Patient has voiced understanding of teaching. Discharge teaching and instructions for diagnosis/procedure of C Birth completed with patient using teachback method. AVS reviewed. Printed prescriptions given to patient. Patient voiced understanding regarding prescriptions, follow up appointments, and care of self at home. Discharged in a wheelchair to  home with support per family Mother's blood type is A+. Mom and baby are discharged via wheelchair in stable condition with FOB.

## 2023-11-25 NOTE — FLOWSHEET NOTE
Blood sugar 161. Pt states she just finished [place] and fries for supper. Will recheck in a couple hours to obtain more accurate bedtime blood sugar.

## 2023-11-25 NOTE — PLAN OF CARE
Problem: Pain  Goal: Verbalizes/displays adequate comfort level or baseline comfort level  Recent Flowsheet Documentation  Taken 11/25/2023 0847 by Mati Shafer RN  Verbalizes/displays adequate comfort level or baseline comfort level:   Encourage patient to monitor pain and request assistance   Assess pain using appropriate pain scale   Administer analgesics based on type and severity of pain and evaluate response   Implement non-pharmacological measures as appropriate and evaluate response  Taken 11/24/2023 2976 by Junaid Cabrera RN  Verbalizes/displays adequate comfort level or baseline comfort level:   Encourage patient to monitor pain and request assistance   Assess pain using appropriate pain scale   Administer analgesics based on type and severity of pain and evaluate response   Implement non-pharmacological measures as appropriate and evaluate response   Consider cultural and social influences on pain and pain management   Notify Licensed Independent Practitioner if interventions unsuccessful or patient reports new pain     Problem: Postpartum  Goal: Experiences normal postpartum course  Description:  Postpartum OB-Pregnancy care plan goal which identifies if the mother is experiencing a normal postpartum course  Recent Flowsheet Documentation  Taken 11/25/2023 0847 by Mati Shafer RN  Experiences Normal Postpartum Course:   Monitor maternal vital signs   Assess uterine involution  Taken 11/24/2023 0885 by Junaid Cabrera RN  Experiences Normal Postpartum Course:   Monitor maternal vital signs   Assess uterine involution     Problem: Infection - Adult  Goal: Absence of infection at discharge  Recent Flowsheet Documentation  Taken 11/25/2023 0847 by Mati Shafer RN  Absence of infection at discharge:   Assess and monitor for signs and symptoms of infection   Monitor lab/diagnostic results   Instruct and encourage patient and family to use good hand hygiene technique  Taken 11/24/2023 2127 by discharge:   Assess and monitor for signs and symptoms of infection   Monitor lab/diagnostic results   Instruct and encourage patient and family to use good hand hygiene technique     Problem: Infection - Adult  Goal: Absence of infection during hospitalization  11/25/2023 0847 by Carmen Mccray RN  Outcome: Progressing  Flowsheets (Taken 11/25/2023 0847)  Absence of infection during hospitalization:   Assess and monitor for signs and symptoms of infection   Monitor lab/diagnostic results   Instruct and encourage patient and family to use good hand hygiene technique     Problem: Safety - Adult  Goal: Free from fall injury  11/25/2023 0847 by Carmen Mccray RN  Outcome: Progressing  Flowsheets (Taken 11/25/2023 0847)  Free From Fall Injury: Instruct family/caregiver on patient safety     Problem: Discharge Planning  Goal: Discharge to home or other facility with appropriate resources  11/25/2023 0847 by Carmen Mccray RN  Outcome: Progressing  Flowsheets (Taken 11/25/2023 5962)  Discharge to home or other facility with appropriate resources: Identify barriers to discharge with patient and caregiver   Care plan reviewed with patient and verbalized understanding. Patient contributed to goal setting.

## 2023-11-25 NOTE — PLAN OF CARE
Problem: Postpartum  Goal: Experiences normal postpartum course  Description:  Postpartum OB-Pregnancy care plan goal which identifies if the mother is experiencing a normal postpartum course  2023 3010 by Toyin Schaefer RN  Outcome: Progressing  Flowsheets (Taken 2023)  Experiences Normal Postpartum Course:   Monitor maternal vital signs   Assess uterine involution     Problem: Postpartum  Goal: Appropriate maternal -  bonding  Description:  Postpartum OB-Pregnancy care plan goal which identifies if the mother and  are bonding appropriately  2023 1736 by Toyin Schaefer RN  Outcome: Progressing  Note: Assess bonding     Problem: Postpartum  Goal: Establishment of infant feeding pattern  Description:  Postpartum OB-Pregnancy care plan goal which identifies if the mother is establishing a feeding pattern with their   2023 6593 by Toyin Schaefer RN  Outcome: Progressing  Flowsheets (Taken 2023)  Establishment of Infant Feeding Pattern: Assess breast/bottle feeding     Problem: Postpartum  Goal: Incisions, wounds, or drain sites healing without S/S of infection  2023 7036 by Toyin Schaefer RN  Outcome: Progressing  Flowsheets (Taken 2023)  Incisions, Wounds, or Drain Sites Healing Without Sign and Symptoms of Infection: TWICE DAILY: Assess and document skin integrity     Problem: Pain  Goal: Verbalizes/displays adequate comfort level or baseline comfort level  2023 1446 by Toyin Schaefer RN  Outcome: Progressing  Flowsheets (Taken 2023)  Verbalizes/displays adequate comfort level or baseline comfort level:   Encourage patient to monitor pain and request assistance   Assess pain using appropriate pain scale   Administer analgesics based on type and severity of pain and evaluate response   Implement non-pharmacological measures as appropriate and evaluate response   Consider cultural and social influences on pain and pain

## 2025-02-04 ENCOUNTER — HOSPITAL ENCOUNTER (OUTPATIENT)
Dept: WOMENS IMAGING | Age: 25
Discharge: HOME | End: 2025-02-04
Payer: COMMERCIAL

## 2025-02-04 VITALS — HEIGHT: 67 IN | WEIGHT: 293 LBS | BODY MASS INDEX: 45.99 KG/M2

## 2025-02-04 DIAGNOSIS — N64.4 MASTODYNIA: ICD-10-CM

## 2025-02-04 PROCEDURE — 76642 ULTRASOUND BREAST LIMITED: CPT

## 2025-04-30 ENCOUNTER — APPOINTMENT (OUTPATIENT)
Dept: GENERAL RADIOLOGY | Age: 25
End: 2025-04-30
Payer: COMMERCIAL

## 2025-04-30 ENCOUNTER — HOSPITAL ENCOUNTER (EMERGENCY)
Age: 25
Discharge: HOME | End: 2025-04-30
Attending: STUDENT IN AN ORGANIZED HEALTH CARE EDUCATION/TRAINING PROGRAM
Payer: COMMERCIAL

## 2025-04-30 VITALS
HEIGHT: 67 IN | HEART RATE: 113 BPM | BODY MASS INDEX: 45.99 KG/M2 | RESPIRATION RATE: 18 BRPM | SYSTOLIC BLOOD PRESSURE: 130 MMHG | OXYGEN SATURATION: 97 % | WEIGHT: 293 LBS | DIASTOLIC BLOOD PRESSURE: 105 MMHG

## 2025-04-30 DIAGNOSIS — S86.912A STRAIN OF LEFT KNEE, INITIAL ENCOUNTER: Primary | ICD-10-CM

## 2025-04-30 PROCEDURE — 6370000000 HC RX 637 (ALT 250 FOR IP)

## 2025-04-30 PROCEDURE — 73562 X-RAY EXAM OF KNEE 3: CPT

## 2025-04-30 PROCEDURE — 99284 EMERGENCY DEPT VISIT MOD MDM: CPT

## 2025-04-30 PROCEDURE — 73564 X-RAY EXAM KNEE 4 OR MORE: CPT

## 2025-04-30 PROCEDURE — 96372 THER/PROPH/DIAG INJ SC/IM: CPT

## 2025-04-30 PROCEDURE — 6360000002 HC RX W HCPCS

## 2025-04-30 RX ORDER — HYDROCODONE BITARTRATE AND ACETAMINOPHEN 5; 325 MG/1; MG/1
1 TABLET ORAL ONCE
Status: COMPLETED | OUTPATIENT
Start: 2025-04-30 | End: 2025-04-30

## 2025-04-30 RX ORDER — HYDROCODONE BITARTRATE AND ACETAMINOPHEN 5; 325 MG/1; MG/1
1 TABLET ORAL EVERY 8 HOURS PRN
Qty: 9 TABLET | Refills: 0 | Status: SHIPPED | OUTPATIENT
Start: 2025-04-30 | End: 2025-05-03

## 2025-04-30 RX ORDER — KETOROLAC TROMETHAMINE 30 MG/ML
30 INJECTION, SOLUTION INTRAMUSCULAR; INTRAVENOUS ONCE
Status: COMPLETED | OUTPATIENT
Start: 2025-04-30 | End: 2025-04-30

## 2025-04-30 RX ADMIN — HYDROCODONE BITARTRATE AND ACETAMINOPHEN 1 TABLET: 5; 325 TABLET ORAL at 22:47

## 2025-04-30 RX ADMIN — KETOROLAC TROMETHAMINE 30 MG: 30 INJECTION, SOLUTION INTRAMUSCULAR at 21:44

## 2025-04-30 ASSESSMENT — PAIN SCALES - GENERAL: PAINLEVEL_OUTOF10: 7

## 2025-04-30 ASSESSMENT — PAIN DESCRIPTION - ORIENTATION: ORIENTATION: LEFT

## 2025-04-30 ASSESSMENT — PAIN - FUNCTIONAL ASSESSMENT: PAIN_FUNCTIONAL_ASSESSMENT: 0-10

## 2025-04-30 ASSESSMENT — PAIN DESCRIPTION - LOCATION: LOCATION: KNEE

## 2025-05-01 NOTE — ED NOTES
Pt to ER due to left knee pain. She reports she fell down the stairs this morning and has had knee pain since.

## 2025-05-01 NOTE — ED PROVIDER NOTES
course for additional information.      ED COURSE   ED Medications administered this visit (None if left blank):   Medications   ketorolac (TORADOL) injection 30 mg (30 mg IntraMUSCular Given 4/30/25 2142)   HYDROcodone-acetaminophen (NORCO) 5-325 MG per tablet 1 tablet (1 tablet Oral Given 4/30/25 2247)            PROCEDURES: (None if blank)  Procedures:     CRITICAL CARE:  Refer to attending note    MEDICATION CHANGES     Current Discharge Medication List        START taking these medications    Details   HYDROcodone-acetaminophen (NORCO) 5-325 MG per tablet Take 1 tablet by mouth every 8 hours as needed for Pain for up to 3 days. Intended supply: 3 days. Take lowest dose possible to manage pain Max Daily Amount: 3 tablets  Qty: 9 tablet, Refills: 0    Associated Diagnoses: Strain of left knee, initial encounter             FINAL DISPOSITION   Shared Decision-Making was performed, disposition discussed with the patient/family and questions answered.    Outpatient follow up (If applicable):  [facility] [address] [place] [address]  [phone]  In 1 week  If symptoms worsen    The results of pertinent diagnostic studies and exam findings were discussed with the patient/surrogate. The patient’s provisional diagnosis and plan of care were discussed with the patient and present family who expressed understanding. Medications were reviewed and indications and risks of medications were discussed with the patient/family present. Strict return precautions and follow up instructions were discussed with the patient prior to discharge, with which the patient agrees.          FINAL DIAGNOSES:  Final diagnoses:   Strain of left knee, initial encounter       Condition:   Dispo: Discharge to home  DISPOSITION Decision To Discharge 04/30/2025 11:05:48 PM   DISPOSITION CONDITION Stable           This transcription was electronically signed. It was dictated by use of voice recognition software and

## 2025-07-28 ENCOUNTER — HOSPITAL ENCOUNTER (EMERGENCY)
Age: 25
Discharge: HOME | End: 2025-07-28
Payer: COMMERCIAL

## 2025-07-28 VITALS
TEMPERATURE: 97.5 F | OXYGEN SATURATION: 95 % | HEART RATE: 105 BPM | RESPIRATION RATE: 16 BRPM | SYSTOLIC BLOOD PRESSURE: 155 MMHG | DIASTOLIC BLOOD PRESSURE: 90 MMHG

## 2025-07-28 DIAGNOSIS — L02.416 ABSCESS OF LEFT THIGH: Primary | ICD-10-CM

## 2025-07-28 PROCEDURE — 99213 OFFICE O/P EST LOW 20 MIN: CPT | Performed by: NURSE PRACTITIONER

## 2025-07-28 PROCEDURE — 99213 OFFICE O/P EST LOW 20 MIN: CPT

## 2025-07-28 RX ORDER — NICOTINE 14MG/24HR
PATCH, TRANSDERMAL 24 HOURS TRANSDERMAL
COMMUNITY
Start: 2025-07-28

## 2025-07-28 ASSESSMENT — ENCOUNTER SYMPTOMS
COLOR CHANGE: 1
CHOKING: 0
COUGH: 0
WHEEZING: 0
APNEA: 0
STRIDOR: 0
CHEST TIGHTNESS: 0
VOMITING: 0
NAUSEA: 0
SHORTNESS OF BREATH: 0

## 2025-07-28 NOTE — ED PROVIDER NOTES
[facility] URGENT CARE  Urgent Care Encounter      CHIEF COMPLAINT       Chief Complaint   Patient presents with    Abscess       Nurses Notes reviewed and I agree except as noted in the HPI.  HISTORY OFPRESENT ILLNESS   Casandra Elias is a 24 y.o.  The history is provided by the patient. No  was used.   Abscess  Location:  Leg  Leg abscess location:  L upper leg  Size:  3  Abscess quality: fluctuance, induration, painful, redness and warmth    Abscess quality: not draining, no itching and not weeping    Red streaking: no    Duration:  2 days  Progression:  Worsening  Pain details:     Quality:  Aching    Severity:  Moderate    Timing:  Constant    Progression:  Unchanged  Chronicity:  New  Context: not diabetes, not immunosuppression, not injected drug use, not insect bite/sting and not skin injury    Relieved by:  Nothing  Worsened by:  Nothing  Ineffective treatments:  None tried  Associated symptoms: no anorexia, no fatigue, no fever, no headaches, no nausea and no vomiting    Risk factors: prior abscess    Risk factors: no family hx of MRSA and no hx of MRSA        REVIEW OF SYSTEMS     Review of Systems   Constitutional:  Negative for activity change, appetite change, chills, diaphoresis, fatigue and fever.   Respiratory:  Negative for apnea, cough, choking, chest tightness, shortness of breath, wheezing and stridor.    Cardiovascular:  Negative for chest pain, palpitations and leg swelling.   Gastrointestinal:  Negative for anorexia, nausea and vomiting.   Skin:  Positive for color change. Negative for pallor, rash and wound.   Neurological:  Negative for headaches.       PAST MEDICAL HISTORY         Diagnosis Date    Anemia     takes extra iron    Depression     Gestational hypertension     was with first baby and was on meds    Insulin controlled gestational diabetes mellitus (GDM) during pregnancy        SURGICAL HISTORY     Patient  has a past surgical history that includes

## 2025-07-28 NOTE — ED NOTES
To [facility] with complaints of abscess to left upper thigh for about 2 days. Painful.      Maria Fernanda Gonzalez, RN  07/28/25 7388

## (undated) DEVICE — SOLUTION IRRIG 1000ML STRL H2O USP PLAS POUR BTL

## (undated) DEVICE — LARGE, DISPOSABLE ALEXIS O C-SECTION PROTECTOR - RETRACTOR: Brand: ALEXIS ® O C-SECTION PROTECTOR - RETRACTOR

## (undated) DEVICE — SUTURE VCRL + SZ 0 L36IN ABSRB VLT L36MM CT-1 1/2 CIR VCP346H

## (undated) DEVICE — SUTURE VCRL + SZ 2-0 L27IN ABSRB CLR CT-1 1/2 CIR TAPERCUT VCP259H

## (undated) DEVICE — GLOVE SURG SZ 65 THK91MIL LTX FREE SYN POLYISOPRENE

## (undated) DEVICE — PACK PROCEDURE SURG C SECT SRMC LF

## (undated) DEVICE — DRESSING ANTIMIC W3.5XL14IN IONIC SIL FOAM POSTOP STRP

## (undated) DEVICE — SUTURE CHROMIC GUT SZ 0 L36IN ABSRB BRN CTX L48MM 1/2 CIR 904H

## (undated) DEVICE — APPLICATOR PREP 26ML 0.7% IOD POVACRYLEX 74% ISO ALC ST

## (undated) DEVICE — DRESSING ANITMICROBIAL FOAM OPTIFOAM POSTOP STRP 35 X 10 IN

## (undated) DEVICE — CLAMP CRD DBL GRP UMB MAINTAINS TIGHT SEAL HNG GRD CONFINES

## (undated) DEVICE — SUTURE VCRL SZ 4-0 L27IN ABSRB UD L60MM KS STR REV CUT NDL J662H

## (undated) DEVICE — SYSTEM TISS RETEN TRS

## (undated) DEVICE — SUTURE VCRL + SZ 3-0 L27IN ABSRB WHT CT-1 1/2 CIR VCP258H

## (undated) DEVICE — SOLUTION SCRB 4OZ 4% CHG H2O AIDED FOR PREOPERATIVE SKIN